# Patient Record
Sex: MALE | Race: WHITE | Employment: FULL TIME | URBAN - METROPOLITAN AREA
[De-identification: names, ages, dates, MRNs, and addresses within clinical notes are randomized per-mention and may not be internally consistent; named-entity substitution may affect disease eponyms.]

---

## 2018-03-31 ENCOUNTER — HOSPITAL ENCOUNTER (EMERGENCY)
Age: 48
Discharge: HOME | End: 2018-03-31
Attending: EMERGENCY MEDICINE
Payer: COMMERCIAL

## 2018-03-31 VITALS
OXYGEN SATURATION: 98 % | WEIGHT: 180 LBS | HEIGHT: 70 IN | TEMPERATURE: 97.9 F | DIASTOLIC BLOOD PRESSURE: 88 MMHG | SYSTOLIC BLOOD PRESSURE: 172 MMHG | BODY MASS INDEX: 25.77 KG/M2 | RESPIRATION RATE: 14 BRPM | HEART RATE: 94 BPM

## 2018-03-31 DIAGNOSIS — L03.115 CELLULITIS OF RIGHT FOOT: Primary | ICD-10-CM

## 2018-03-31 PROCEDURE — 6360000002 HC RX W HCPCS: Performed by: EMERGENCY MEDICINE

## 2018-03-31 PROCEDURE — 96372 THER/PROPH/DIAG INJ SC/IM: CPT

## 2018-03-31 PROCEDURE — 2580000003 HC RX 258

## 2018-03-31 PROCEDURE — 99282 EMERGENCY DEPT VISIT SF MDM: CPT

## 2018-03-31 RX ORDER — CEPHALEXIN 500 MG/1
500 CAPSULE ORAL 4 TIMES DAILY
Qty: 40 CAPSULE | Refills: 0 | Status: SHIPPED | OUTPATIENT
Start: 2018-03-31 | End: 2018-04-10

## 2018-03-31 RX ORDER — CEFAZOLIN SODIUM 1 G/3ML
1 INJECTION, POWDER, FOR SOLUTION INTRAMUSCULAR; INTRAVENOUS ONCE
Status: COMPLETED | OUTPATIENT
Start: 2018-03-31 | End: 2018-03-31

## 2018-03-31 RX ORDER — CLINDAMYCIN HYDROCHLORIDE 150 MG/1
150 CAPSULE ORAL 4 TIMES DAILY
Qty: 80 CAPSULE | Refills: 0 | Status: SHIPPED | OUTPATIENT
Start: 2018-03-31 | End: 2018-04-10

## 2018-03-31 RX ADMIN — CEFAZOLIN SODIUM 1 G: 1 INJECTION, POWDER, FOR SOLUTION INTRAMUSCULAR; INTRAVENOUS at 16:04

## 2018-03-31 RX ADMIN — WATER 2.5 ML: 1 INJECTION INTRAMUSCULAR; INTRAVENOUS; SUBCUTANEOUS at 16:04

## 2020-01-08 ENCOUNTER — TELEPHONE (OUTPATIENT)
Dept: WOUND CARE | Age: 50
End: 2020-01-08

## 2020-02-25 ENCOUNTER — HOSPITAL ENCOUNTER (OUTPATIENT)
Age: 50
Discharge: HOME | End: 2020-02-25
Payer: COMMERCIAL

## 2020-02-25 ENCOUNTER — HOSPITAL ENCOUNTER (OUTPATIENT)
Age: 50
Discharge: HOME | End: 2020-02-27
Payer: COMMERCIAL

## 2020-02-25 LAB
ANION GAP SERPL CALCULATED.3IONS-SCNC: 10 MMOL/L (ref 7–16)
BUN BLDV-MCNC: 22 MG/DL (ref 6–20)
CALCIUM SERPL-MCNC: 9.3 MG/DL (ref 8.6–10.2)
CHLORIDE BLD-SCNC: 101 MMOL/L (ref 98–107)
CO2: 28 MMOL/L (ref 22–29)
CREAT SERPL-MCNC: 1.1 MG/DL (ref 0.7–1.2)
GFR AFRICAN AMERICAN: >60
GFR NON-AFRICAN AMERICAN: >60 ML/MIN/1.73
GLUCOSE BLD-MCNC: 255 MG/DL (ref 74–99)
POTASSIUM SERPL-SCNC: 4.1 MMOL/L (ref 3.5–5)
SODIUM BLD-SCNC: 139 MMOL/L (ref 132–146)

## 2020-02-25 PROCEDURE — 80048 BASIC METABOLIC PNL TOTAL CA: CPT

## 2020-02-25 PROCEDURE — 93005 ELECTROCARDIOGRAM TRACING: CPT | Performed by: OPHTHALMOLOGY

## 2020-02-25 PROCEDURE — 36415 COLL VENOUS BLD VENIPUNCTURE: CPT

## 2020-02-26 LAB
EKG ATRIAL RATE: 82 BPM
EKG P AXIS: 53 DEGREES
EKG P-R INTERVAL: 140 MS
EKG Q-T INTERVAL: 378 MS
EKG QRS DURATION: 100 MS
EKG QTC CALCULATION (BAZETT): 441 MS
EKG R AXIS: 20 DEGREES
EKG T AXIS: 14 DEGREES
EKG VENTRICULAR RATE: 82 BPM

## 2020-02-26 PROCEDURE — 93010 ELECTROCARDIOGRAM REPORT: CPT | Performed by: INTERNAL MEDICINE

## 2020-04-01 RX ORDER — AMLODIPINE BESYLATE 5 MG/1
5 TABLET ORAL DAILY
COMMUNITY

## 2020-04-01 RX ORDER — PREDNISOLONE SODIUM PHOSPHATE 10 MG/ML
1 SOLUTION/ DROPS OPHTHALMIC DAILY
COMMUNITY

## 2020-04-01 RX ORDER — ROSUVASTATIN CALCIUM 5 MG/1
5 TABLET, COATED ORAL DAILY
COMMUNITY

## 2020-04-01 RX ORDER — GLIMEPIRIDE 4 MG/1
4 TABLET ORAL
COMMUNITY

## 2020-04-01 RX ORDER — GABAPENTIN 300 MG/1
300 CAPSULE ORAL EVERY EVENING
COMMUNITY

## 2020-04-01 NOTE — PROGRESS NOTES
[facility] PRE-ADMISSION TESTING INSTRUCTIONS    The Preadmission Testing patient is instructed accordingly using the following criteria (check applicable):    ARRIVAL INSTRUCTIONS:  [x] Parking the day of Surgery is located in the Main Entrance lot. Upon entering the door, make an immediate right to the surgery reception desk    [x] Bring photo ID and insurance card    [] Bring in a copy of Living will or Durable Power of  papers. [x] Please be sure to arrange for responsible adult to provide transportation to and from the hospital    [x] Please arrange for responsible adult to be with you for the 24 hour period post procedure due to having anesthesia      GENERAL INSTRUCTIONS:    [x] Nothing by mouth after midnight, including gum, candy, mints or water    [x] You may brush your teeth, but do not swallow any water    [x] Take medications as instructed with 1-2 oz of water    [x] Stop herbal supplements and vitamins 5 days prior to procedure    [x] Follow preop dosing of blood thinners per physician instructions    [] Take 1/2 dose of evening insulin, but no insulin after midnight    [x] No oral diabetic medications after midnight    [] If diabetic and have low blood sugar or feel symptomatic, take 1-2oz apple juice only    [] Bring inhalers day of surgery    [] Bring C-PAP/ Bi-Pap day of surgery    [] Bring urine specimen day of surgery    [x] Shower or bath with soap, lather and rinse well, AM of Surgery, no lotion, powders or creams to surgical site    [] Follow bowel prep as instructed per surgeon    [x] No tobacco products within 24 hours of surgery     [x] No alcohol or illegal drug use within 24 hours of surgery.     [x] Jewelry, body piercing's, eyeglasses, contact lenses and dentures are not permitted into surgery (bring cases)      [] Please do not wear any nail polish, make up or hair products on the day of surgery    [x] If not already done, you can expect a call from registration    [x] You can expect a call the business day prior to procedure to notify you if your arrival time changes    [] If you receive a survey after surgery we would greatly appreciate your comments    [] Parent/guardian of a minor must accompany their child and remain on the premises  the entire time they are under our care     [] Pediatric patients may bring favorite toy, blanket or comfort item with them    [] A caregiver or family member must remain with the patient during their stay if they are mentally handicapped, have dementia, disoriented or unable to use a call light or would be a safety concern if left unattended    [x] Please notify surgeon if you develop any illness between now and time of surgery (cold, cough, sore throat, fever, nausea, vomiting) or any signs of infections  including skin, wounds, and dental.    [x]  The Outpatient Pharmacy is available to fill your prescription here on your day of surgery, ask your preop nurse for details    [] Other instructions  EDUCATIONAL MATERIALS PROVIDED:    [] PAT Preoperative Education Packet/Booklet     [] Medication List    [] Fluoroscopy Information Pamphlet    [] Transfusion bracelet applied with instructions    [] Joint replacement video reviewed    [] Shower with soap, lather and rinse well, and use CHG wipes provided the evening before surgery as instructed

## 2020-04-06 NOTE — PROGRESS NOTES
Received call from [facility] from Dr Angelina Frausto, states verified with pt PCP preop medication instructions and will contact pt to reinstruct pt to take amlodipine am dos.

## 2020-04-07 ENCOUNTER — ANESTHESIA (OUTPATIENT)
Dept: OPERATING ROOM | Age: 50
End: 2020-04-07
Payer: COMMERCIAL

## 2020-04-07 ENCOUNTER — ANESTHESIA EVENT (OUTPATIENT)
Dept: OPERATING ROOM | Age: 50
End: 2020-04-07
Payer: COMMERCIAL

## 2020-04-07 ENCOUNTER — HOSPITAL ENCOUNTER (OUTPATIENT)
Age: 50
Setting detail: OUTPATIENT SURGERY
Discharge: HOME | End: 2020-04-07
Attending: OPHTHALMOLOGY | Admitting: OPHTHALMOLOGY
Payer: COMMERCIAL

## 2020-04-07 VITALS
HEIGHT: 70 IN | DIASTOLIC BLOOD PRESSURE: 76 MMHG | TEMPERATURE: 98.1 F | SYSTOLIC BLOOD PRESSURE: 140 MMHG | WEIGHT: 185 LBS | HEART RATE: 84 BPM | OXYGEN SATURATION: 98 % | BODY MASS INDEX: 26.48 KG/M2 | RESPIRATION RATE: 18 BRPM

## 2020-04-07 VITALS
DIASTOLIC BLOOD PRESSURE: 84 MMHG | OXYGEN SATURATION: 97 % | RESPIRATION RATE: 20 BRPM | SYSTOLIC BLOOD PRESSURE: 162 MMHG

## 2020-04-07 LAB
METER GLUCOSE: 108 MG/DL (ref 74–99)
METER GLUCOSE: 155 MG/DL (ref 74–99)

## 2020-04-07 PROCEDURE — 3700000000 HC ANESTHESIA ATTENDED CARE: Performed by: OPHTHALMOLOGY

## 2020-04-07 PROCEDURE — 7100000010 HC PHASE II RECOVERY - FIRST 15 MIN: Performed by: OPHTHALMOLOGY

## 2020-04-07 PROCEDURE — 2500000003 HC RX 250 WO HCPCS: Performed by: NURSE ANESTHETIST, CERTIFIED REGISTERED

## 2020-04-07 PROCEDURE — 3600000013 HC SURGERY LEVEL 3 ADDTL 15MIN: Performed by: OPHTHALMOLOGY

## 2020-04-07 PROCEDURE — 6370000000 HC RX 637 (ALT 250 FOR IP): Performed by: OPHTHALMOLOGY

## 2020-04-07 PROCEDURE — 6360000002 HC RX W HCPCS: Performed by: NURSE ANESTHETIST, CERTIFIED REGISTERED

## 2020-04-07 PROCEDURE — 2580000003 HC RX 258: Performed by: NURSE ANESTHETIST, CERTIFIED REGISTERED

## 2020-04-07 PROCEDURE — 2709999900 HC NON-CHARGEABLE SUPPLY: Performed by: OPHTHALMOLOGY

## 2020-04-07 PROCEDURE — C1784 OCULAR DEV, INTRAOP, DET RET: HCPCS | Performed by: OPHTHALMOLOGY

## 2020-04-07 PROCEDURE — 2720000010 HC SURG SUPPLY STERILE: Performed by: OPHTHALMOLOGY

## 2020-04-07 PROCEDURE — 2500000003 HC RX 250 WO HCPCS: Performed by: OPHTHALMOLOGY

## 2020-04-07 PROCEDURE — 3600000003 HC SURGERY LEVEL 3 BASE: Performed by: OPHTHALMOLOGY

## 2020-04-07 PROCEDURE — 3700000001 HC ADD 15 MINUTES (ANESTHESIA): Performed by: OPHTHALMOLOGY

## 2020-04-07 PROCEDURE — 6360000002 HC RX W HCPCS: Performed by: OPHTHALMOLOGY

## 2020-04-07 PROCEDURE — 82962 GLUCOSE BLOOD TEST: CPT

## 2020-04-07 PROCEDURE — 7100000011 HC PHASE II RECOVERY - ADDTL 15 MIN: Performed by: OPHTHALMOLOGY

## 2020-04-07 PROCEDURE — C1814 RETINAL TAMP, SILICONE OIL: HCPCS | Performed by: OPHTHALMOLOGY

## 2020-04-07 DEVICE — KIT OPHTH 7ML PERFLUOROCARBON LIQ PROC PERFLUORON: Type: IMPLANTABLE DEVICE | Site: EYE | Status: FUNCTIONAL

## 2020-04-07 DEVICE — SOLUTION SURG FLUID ADATOSIL 5000: Type: IMPLANTABLE DEVICE | Site: EYE | Status: FUNCTIONAL

## 2020-04-07 RX ORDER — PROPARACAINE HYDROCHLORIDE 5 MG/ML
1 SOLUTION/ DROPS OPHTHALMIC EVERY 5 MIN PRN
Status: COMPLETED | OUTPATIENT
Start: 2020-04-07 | End: 2020-04-07

## 2020-04-07 RX ORDER — FENTANYL CITRATE 50 UG/ML
INJECTION, SOLUTION INTRAMUSCULAR; INTRAVENOUS PRN
Status: DISCONTINUED | OUTPATIENT
Start: 2020-04-07 | End: 2020-04-07 | Stop reason: SDUPTHER

## 2020-04-07 RX ORDER — SODIUM CHLORIDE 0.9 % (FLUSH) 0.9 %
10 SYRINGE (ML) INJECTION PRN
Status: CANCELLED | OUTPATIENT
Start: 2020-04-07

## 2020-04-07 RX ORDER — SODIUM CHLORIDE, SODIUM LACTATE, POTASSIUM CHLORIDE, CALCIUM CHLORIDE 600; 310; 30; 20 MG/100ML; MG/100ML; MG/100ML; MG/100ML
INJECTION, SOLUTION INTRAVENOUS CONTINUOUS PRN
Status: DISCONTINUED | OUTPATIENT
Start: 2020-04-07 | End: 2020-04-07 | Stop reason: SDUPTHER

## 2020-04-07 RX ORDER — HYDROCODONE BITARTRATE AND ACETAMINOPHEN 5; 325 MG/1; MG/1
2 TABLET ORAL PRN
Status: DISCONTINUED | OUTPATIENT
Start: 2020-04-07 | End: 2020-04-07 | Stop reason: HOSPADM

## 2020-04-07 RX ORDER — LABETALOL HYDROCHLORIDE 5 MG/ML
INJECTION, SOLUTION INTRAVENOUS PRN
Status: DISCONTINUED | OUTPATIENT
Start: 2020-04-07 | End: 2020-04-07 | Stop reason: SDUPTHER

## 2020-04-07 RX ORDER — SODIUM CHLORIDE 0.9 % (FLUSH) 0.9 %
10 SYRINGE (ML) INJECTION EVERY 12 HOURS SCHEDULED
Status: DISCONTINUED | OUTPATIENT
Start: 2020-04-07 | End: 2020-04-07 | Stop reason: HOSPADM

## 2020-04-07 RX ORDER — HYDROCODONE BITARTRATE AND ACETAMINOPHEN 5; 325 MG/1; MG/1
1 TABLET ORAL PRN
Status: DISCONTINUED | OUTPATIENT
Start: 2020-04-07 | End: 2020-04-07 | Stop reason: HOSPADM

## 2020-04-07 RX ORDER — CYCLOPENTOLATE HYDROCHLORIDE 10 MG/ML
1 SOLUTION/ DROPS OPHTHALMIC EVERY 5 MIN PRN
Status: COMPLETED | OUTPATIENT
Start: 2020-04-07 | End: 2020-04-07

## 2020-04-07 RX ORDER — SODIUM CHLORIDE 0.9 % (FLUSH) 0.9 %
10 SYRINGE (ML) INJECTION EVERY 12 HOURS SCHEDULED
Status: CANCELLED | OUTPATIENT
Start: 2020-04-07

## 2020-04-07 RX ORDER — PROPARACAINE HYDROCHLORIDE 5 MG/ML
SOLUTION/ DROPS OPHTHALMIC PRN
Status: DISCONTINUED | OUTPATIENT
Start: 2020-04-07 | End: 2020-04-07 | Stop reason: ALTCHOICE

## 2020-04-07 RX ORDER — MIDAZOLAM HYDROCHLORIDE 1 MG/ML
INJECTION INTRAMUSCULAR; INTRAVENOUS PRN
Status: DISCONTINUED | OUTPATIENT
Start: 2020-04-07 | End: 2020-04-07 | Stop reason: SDUPTHER

## 2020-04-07 RX ORDER — SODIUM CHLORIDE 0.9 % (FLUSH) 0.9 %
10 SYRINGE (ML) INJECTION PRN
Status: DISCONTINUED | OUTPATIENT
Start: 2020-04-07 | End: 2020-04-07 | Stop reason: HOSPADM

## 2020-04-07 RX ORDER — DEXAMETHASONE SODIUM PHOSPHATE 4 MG/ML
INJECTION, SOLUTION INTRA-ARTICULAR; INTRALESIONAL; INTRAMUSCULAR; INTRAVENOUS; SOFT TISSUE PRN
Status: DISCONTINUED | OUTPATIENT
Start: 2020-04-07 | End: 2020-04-07 | Stop reason: SDUPTHER

## 2020-04-07 RX ORDER — SODIUM CHLORIDE 9 MG/ML
INJECTION, SOLUTION INTRAVENOUS CONTINUOUS PRN
Status: DISCONTINUED | OUTPATIENT
Start: 2020-04-07 | End: 2020-04-07

## 2020-04-07 RX ORDER — CEFAZOLIN SODIUM 1 G/3ML
INJECTION, POWDER, FOR SOLUTION INTRAMUSCULAR; INTRAVENOUS PRN
Status: DISCONTINUED | OUTPATIENT
Start: 2020-04-07 | End: 2020-04-07 | Stop reason: ALTCHOICE

## 2020-04-07 RX ORDER — PHENYLEPHRINE HYDROCHLORIDE 100 MG/ML
1 SOLUTION/ DROPS OPHTHALMIC EVERY 5 MIN PRN
Status: COMPLETED | OUTPATIENT
Start: 2020-04-07 | End: 2020-04-07

## 2020-04-07 RX ORDER — DEXAMETHASONE SODIUM PHOSPHATE 10 MG/ML
INJECTION INTRAMUSCULAR; INTRAVENOUS PRN
Status: DISCONTINUED | OUTPATIENT
Start: 2020-04-07 | End: 2020-04-07 | Stop reason: ALTCHOICE

## 2020-04-07 RX ORDER — TROPICAMIDE 10 MG/ML
1 SOLUTION/ DROPS OPHTHALMIC EVERY 5 MIN PRN
Status: COMPLETED | OUTPATIENT
Start: 2020-04-07 | End: 2020-04-07

## 2020-04-07 RX ORDER — ONDANSETRON 2 MG/ML
INJECTION INTRAMUSCULAR; INTRAVENOUS PRN
Status: DISCONTINUED | OUTPATIENT
Start: 2020-04-07 | End: 2020-04-07 | Stop reason: SDUPTHER

## 2020-04-07 RX ADMIN — SODIUM CHLORIDE, POTASSIUM CHLORIDE, SODIUM LACTATE AND CALCIUM CHLORIDE: 600; 310; 30; 20 INJECTION, SOLUTION INTRAVENOUS at 13:04

## 2020-04-07 RX ADMIN — LABETALOL HYDROCHLORIDE 10 MG: 5 INJECTION INTRAVENOUS at 14:26

## 2020-04-07 RX ADMIN — Medication 1 DROP: at 11:04

## 2020-04-07 RX ADMIN — MIDAZOLAM 2 MG: 1 INJECTION INTRAMUSCULAR; INTRAVENOUS at 12:11

## 2020-04-07 RX ADMIN — ONDANSETRON HYDROCHLORIDE 4 MG: 2 INJECTION, SOLUTION INTRAMUSCULAR; INTRAVENOUS at 12:17

## 2020-04-07 RX ADMIN — PHENYLEPHRINE HYDROCHLORIDE 1 DROP: 100 SOLUTION/ DROPS OPHTHALMIC at 11:12

## 2020-04-07 RX ADMIN — Medication 1 DROP: at 11:12

## 2020-04-07 RX ADMIN — SODIUM CHLORIDE, POTASSIUM CHLORIDE, SODIUM LACTATE AND CALCIUM CHLORIDE: 600; 310; 30; 20 INJECTION, SOLUTION INTRAVENOUS at 12:11

## 2020-04-07 RX ADMIN — PHENYLEPHRINE HYDROCHLORIDE 1 DROP: 100 SOLUTION/ DROPS OPHTHALMIC at 11:04

## 2020-04-07 RX ADMIN — Medication 1 DROP: at 11:16

## 2020-04-07 RX ADMIN — DEXAMETHASONE SODIUM PHOSPHATE 10 MG: 4 INJECTION, SOLUTION INTRAMUSCULAR; INTRAVENOUS at 12:17

## 2020-04-07 RX ADMIN — MIDAZOLAM 1 MG: 1 INJECTION INTRAMUSCULAR; INTRAVENOUS at 13:34

## 2020-04-07 RX ADMIN — Medication 1 DROP: at 11:17

## 2020-04-07 RX ADMIN — MIDAZOLAM 1 MG: 1 INJECTION INTRAMUSCULAR; INTRAVENOUS at 12:55

## 2020-04-07 RX ADMIN — PHENYLEPHRINE HYDROCHLORIDE 1 DROP: 100 SOLUTION/ DROPS OPHTHALMIC at 11:17

## 2020-04-07 RX ADMIN — FENTANYL CITRATE 100 MCG: 50 INJECTION, SOLUTION INTRAMUSCULAR; INTRAVENOUS at 12:13

## 2020-04-07 ASSESSMENT — PULMONARY FUNCTION TESTS
PIF_VALUE: 0
PIF_VALUE: 1
PIF_VALUE: 1

## 2020-04-07 ASSESSMENT — PAIN SCALES - GENERAL
PAINLEVEL_OUTOF10: 0
PAINLEVEL_OUTOF10: 0

## 2020-04-08 NOTE — OP NOTE
wire  lid speculum was inserted into the conjunctival fornices. Trocars were  introduced inferotemporally, superotemporally, and superonasally each  3.5 mm posterior to the limbus. An infusion cannula was opened under  direct visualization. A core vitrectomy was carried out. After the  blood had been evacuated, it was noted that there was still attached  hyaloid noted in the superonasal quadrant as well as in the superior  area and as well as in the inferotemporal area where there was a contraction   of the hyaloid. The vitreous was then  peeled and trimmed as best as possible. At the beginning of the case,  there was noted to be several retinal holes, and the retina scattered  throughout the superonasal as well as the temporal area; and with  membrane peeling, these holes enlarged or several new holes did form. Therefore, after his muscle tractions could be relived, it was necessary  to perform an inferior retinectomy as there was multiple retinal holes,  most likely nonfunctional retina in that area; therefore, an inferior  retinectomy was performed, and the retina was then flattened with  perfluorocarbon. Air-fluid exchange was then performed, and the retina  flattened nicely. Endolaser was then done, and any residual  perfluorocarbon was then removed, and silicon oil was then placed at a  pressure of 15. After adequate silicon oil was filled, trocars were  removed and closed with interrupted 7-0 Vicryl stitches. At the end of  the case, there was good physiologic pressure of 15. Subconjunctival  dexamethasone and Ancef were injected. TobraDex ointment was placed in  the eye, and the patient returned to the recovery room in stable  condition after the eye was patched and shielded.         Judit Prather MD    D: 04/07/2020 15:03:43       T: 04/07/2020 16:10:46     [id number]  Job#: [id number]     Doc#: [id number]    CC:

## 2023-07-07 ENCOUNTER — HOSPITAL ENCOUNTER (EMERGENCY)
Age: 53
Discharge: HOME | End: 2023-07-08
Attending: EMERGENCY MEDICINE
Payer: COMMERCIAL

## 2023-07-07 ENCOUNTER — APPOINTMENT (OUTPATIENT)
Dept: GENERAL RADIOLOGY | Age: 53
End: 2023-07-07
Payer: COMMERCIAL

## 2023-07-07 VITALS
RESPIRATION RATE: 16 BRPM | HEART RATE: 94 BPM | SYSTOLIC BLOOD PRESSURE: 170 MMHG | TEMPERATURE: 97.6 F | DIASTOLIC BLOOD PRESSURE: 88 MMHG | OXYGEN SATURATION: 98 %

## 2023-07-07 DIAGNOSIS — S90.822A BLISTER OF LEFT FOOT, INITIAL ENCOUNTER: Primary | ICD-10-CM

## 2023-07-07 DIAGNOSIS — I10 HYPERTENSION, UNSPECIFIED TYPE: ICD-10-CM

## 2023-07-07 DIAGNOSIS — N17.9 AKI (ACUTE KIDNEY INJURY) (HCC): ICD-10-CM

## 2023-07-07 LAB
BASOPHILS # BLD: 0.04 E9/L (ref 0–0.2)
BASOPHILS NFR BLD: 0.5 % (ref 0–2)
CHP ED QC CHECK: NORMAL
EOSINOPHIL # BLD: 0.16 E9/L (ref 0.05–0.5)
EOSINOPHIL NFR BLD: 2.1 % (ref 0–6)
ERYTHROCYTE [DISTWIDTH] IN BLOOD BY AUTOMATED COUNT: 13.4 FL (ref 11.5–15)
GLUCOSE BLD-MCNC: 111 MG/DL
HCT VFR BLD AUTO: 35.8 % (ref 37–54)
HGB BLD-MCNC: 12.7 G/DL (ref 12.5–16.5)
IMM GRANULOCYTES # BLD: 0.02 E9/L
IMM GRANULOCYTES NFR BLD: 0.3 % (ref 0–5)
LYMPHOCYTES # BLD: 1.13 E9/L (ref 1.5–4)
LYMPHOCYTES NFR BLD: 15 % (ref 20–42)
MCH RBC QN AUTO: 28.7 PG (ref 26–35)
MCHC RBC AUTO-ENTMCNC: 35.5 % (ref 32–34.5)
MCV RBC AUTO: 81 FL (ref 80–99.9)
METER GLUCOSE: 111 MG/DL (ref 74–99)
MONOCYTES # BLD: 0.92 E9/L (ref 0.1–0.95)
MONOCYTES NFR BLD: 12.2 % (ref 2–12)
NEUTROPHILS # BLD: 5.25 E9/L (ref 1.8–7.3)
NEUTS SEG NFR BLD: 69.9 % (ref 43–80)
PLATELET # BLD AUTO: 177 E9/L (ref 130–450)
PMV BLD AUTO: 10.4 FL (ref 7–12)
RBC # BLD AUTO: 4.42 E12/L (ref 3.8–5.8)
WBC # BLD: 7.5 E9/L (ref 4.5–11.5)

## 2023-07-07 PROCEDURE — 99284 EMERGENCY DEPT VISIT MOD MDM: CPT

## 2023-07-07 PROCEDURE — 82962 GLUCOSE BLOOD TEST: CPT

## 2023-07-07 PROCEDURE — 80048 BASIC METABOLIC PNL TOTAL CA: CPT

## 2023-07-07 PROCEDURE — 73630 X-RAY EXAM OF FOOT: CPT

## 2023-07-07 PROCEDURE — 85025 COMPLETE CBC W/AUTO DIFF WBC: CPT

## 2023-07-07 PROCEDURE — 36415 COLL VENOUS BLD VENIPUNCTURE: CPT

## 2023-07-07 PROCEDURE — 6370000000 HC RX 637 (ALT 250 FOR IP): Performed by: EMERGENCY MEDICINE

## 2023-07-07 RX ORDER — BACITRACIN ZINC 500 [USP'U]/G
OINTMENT TOPICAL ONCE
Status: COMPLETED | OUTPATIENT
Start: 2023-07-07 | End: 2023-07-07

## 2023-07-07 RX ORDER — BACITRACIN ZINC AND POLYMYXIN B SULFATE 500; 1000 [USP'U]/G; [USP'U]/G
OINTMENT TOPICAL
Qty: 30 G | Refills: 0 | Status: SHIPPED | OUTPATIENT
Start: 2023-07-07 | End: 2023-07-14

## 2023-07-07 RX ORDER — CEPHALEXIN 500 MG/1
500 CAPSULE ORAL 4 TIMES DAILY
Qty: 28 CAPSULE | Refills: 0 | Status: SHIPPED | OUTPATIENT
Start: 2023-07-07 | End: 2023-07-14

## 2023-07-07 RX ADMIN — BACITRACIN ZINC: 500 OINTMENT TOPICAL at 23:30

## 2023-07-07 ASSESSMENT — LIFESTYLE VARIABLES
HOW MANY STANDARD DRINKS CONTAINING ALCOHOL DO YOU HAVE ON A TYPICAL DAY: PATIENT DOES NOT DRINK
HOW OFTEN DO YOU HAVE A DRINK CONTAINING ALCOHOL: NEVER

## 2023-07-07 ASSESSMENT — PAIN - FUNCTIONAL ASSESSMENT: PAIN_FUNCTIONAL_ASSESSMENT: NONE - DENIES PAIN

## 2023-07-08 LAB
ANION GAP SERPL CALCULATED.3IONS-SCNC: 12 MMOL/L (ref 7–16)
BUN SERPL-MCNC: 40 MG/DL (ref 6–20)
CALCIUM SERPL-MCNC: 9.1 MG/DL (ref 8.6–10.2)
CHLORIDE SERPL-SCNC: 103 MMOL/L (ref 98–107)
CO2 SERPL-SCNC: 22 MMOL/L (ref 22–29)
CREAT SERPL-MCNC: 1.9 MG/DL (ref 0.7–1.2)
GLUCOSE SERPL-MCNC: 113 MG/DL (ref 74–99)
POTASSIUM SERPL-SCNC: 4.2 MMOL/L (ref 3.5–5)
SODIUM SERPL-SCNC: 137 MMOL/L (ref 132–146)

## 2023-07-08 RX ORDER — 0.9 % SODIUM CHLORIDE 0.9 %
1000 INTRAVENOUS SOLUTION INTRAVENOUS ONCE
Status: DISCONTINUED | OUTPATIENT
Start: 2023-07-08 | End: 2023-07-08 | Stop reason: HOSPADM

## 2023-07-08 ASSESSMENT — ENCOUNTER SYMPTOMS
BACK PAIN: 0
COUGH: 0
COLOR CHANGE: 1
NAUSEA: 0
ABDOMINAL PAIN: 0
SHORTNESS OF BREATH: 0
TROUBLE SWALLOWING: 0
DIARRHEA: 0

## 2023-07-08 ASSESSMENT — PAIN - FUNCTIONAL ASSESSMENT: PAIN_FUNCTIONAL_ASSESSMENT: NONE - DENIES PAIN

## 2023-07-08 NOTE — DISCHARGE INSTRUCTIONS
Call family doctor tomorrow and schedule a followup appointment to be seen in 2 days    Have your doctor obtain  finalized report of all testing    XR FOOT LEFT (MIN 3 VIEWS)   Final Result   1. Soft tissue irregularity and soft tissue swelling along the palmar aspect   of the left forefoot seen best on the lateral view. 2.  No evidence of osseous destruction or erosion. Interval left large toe   partial amputation. Vascular calcifications in the soft tissues.            Results for orders placed or performed during the hospital encounter of 07/07/23   CBC with Auto Differential   Result Value Ref Range    WBC 7.5 4.5 - 11.5 E9/L    RBC 4.42 3.80 - 5.80 E12/L    Hemoglobin 12.7 12.5 - 16.5 g/dL    Hematocrit 35.8 (L) 37.0 - 54.0 %    MCV 81.0 80.0 - 99.9 fL    MCH 28.7 26.0 - 35.0 pg    MCHC 35.5 (H) 32.0 - 34.5 %    RDW 13.4 11.5 - 15.0 fL    Platelets 061 350 - 705 E9/L    MPV 10.4 7.0 - 12.0 fL    Neutrophils % 69.9 43.0 - 80.0 %    Immature Granulocytes % 0.3 0.0 - 5.0 %    Lymphocytes % 15.0 (L) 20.0 - 42.0 %    Monocytes % 12.2 (H) 2.0 - 12.0 %    Eosinophils % 2.1 0.0 - 6.0 %    Basophils % 0.5 0.0 - 2.0 %    Neutrophils Absolute 5.25 1.80 - 7.30 E9/L    Immature Granulocytes # 0.02 E9/L    Lymphocytes Absolute 1.13 (L) 1.50 - 4.00 E9/L    Monocytes Absolute 0.92 0.10 - 0.95 E9/L    Eosinophils Absolute 0.16 0.05 - 0.50 E9/L    Basophils Absolute 0.04 0.00 - 0.20 E9/L   BMP   Result Value Ref Range    Sodium 137 132 - 146 mmol/L    Potassium 4.2 3.5 - 5.0 mmol/L    Chloride 103 98 - 107 mmol/L    CO2 22 22 - 29 mmol/L    Anion Gap 12 7 - 16 mmol/L    Glucose 113 (H) 74 - 99 mg/dL    BUN 40 (H) 6 - 20 mg/dL    Creatinine 1.9 (H) 0.7 - 1.2 mg/dL    Est, Glom Filt Rate 42 >=60 mL/min/1.73    Calcium 9.1 8.6 - 10.2 mg/dL   POCT Glucose   Result Value Ref Range    Glucose 111 mg/dL    QC OK? pass    POCT Glucose   Result Value Ref Range    Meter Glucose 111 (H) 74 - 99 mg/dL

## 2023-08-02 LAB — URINE CULTURE: NORMAL

## 2023-08-17 ENCOUNTER — HOSPITAL ENCOUNTER (OUTPATIENT)
Age: 53
Discharge: HOME | End: 2023-08-17
Payer: COMMERCIAL

## 2023-08-17 LAB
CHOLEST SERPL-MCNC: 126 MG/DL
CREAT SERPL-MCNC: 1.7 MG/DL (ref 0.7–1.2)
FSH SERPL-ACNC: 2.6 MIU/ML
GFR SERPL CREATININE-BSD FRML MDRD: 48 ML/MIN/1.73M2
HBA1C MFR BLD: 6.3 % (ref 4–5.6)
HCT VFR BLD AUTO: 40 % (ref 37–54)
HDLC SERPL-MCNC: 41 MG/DL
LDLC SERPL CALC-MCNC: 67 MG/DL
LH SERPL-ACNC: 4.9 MIU/ML
PROLACTIN SERPL-MCNC: 6.06 NG/ML
TESTOST SERPL-MCNC: 514 NG/DL (ref 193–740)
TRIGL SERPL-MCNC: 88 MG/DL
VLDLC SERPL CALC-MCNC: 18 MG/DL

## 2023-08-17 PROCEDURE — 84146 ASSAY OF PROLACTIN: CPT

## 2023-08-17 PROCEDURE — 83002 ASSAY OF GONADOTROPIN (LH): CPT

## 2023-08-17 PROCEDURE — 80061 LIPID PANEL: CPT

## 2023-08-17 PROCEDURE — 83036 HEMOGLOBIN GLYCOSYLATED A1C: CPT

## 2023-08-17 PROCEDURE — 82565 ASSAY OF CREATININE: CPT

## 2023-08-17 PROCEDURE — 83001 ASSAY OF GONADOTROPIN (FSH): CPT

## 2023-08-17 PROCEDURE — 84403 ASSAY OF TOTAL TESTOSTERONE: CPT

## 2023-08-17 PROCEDURE — 85014 HEMATOCRIT: CPT

## 2023-10-11 ENCOUNTER — DOCUMENTATION (OUTPATIENT)
Dept: PREADMISSION TESTING | Facility: HOSPITAL | Age: 53
End: 2023-10-11
Payer: COMMERCIAL

## 2023-10-11 PROBLEM — E29.1 HYPOGONADISM IN MALE: Status: ACTIVE | Noted: 2023-10-11

## 2023-10-11 PROBLEM — N52.9 ERECTILE DYSFUNCTION: Status: ACTIVE | Noted: 2023-10-11

## 2023-10-11 RX ORDER — AMLODIPINE BESYLATE 5 MG/1
5 TABLET ORAL DAILY
COMMUNITY
Start: 2023-06-03 | End: 2023-10-11 | Stop reason: WASHOUT

## 2023-10-11 RX ORDER — DORZOLAMIDE HCL 20 MG/ML
SOLUTION/ DROPS OPHTHALMIC
COMMUNITY
Start: 2023-07-28 | End: 2023-10-11 | Stop reason: WASHOUT

## 2023-10-11 RX ORDER — ROSUVASTATIN CALCIUM 5 MG/1
5 TABLET, COATED ORAL DAILY
COMMUNITY
Start: 2023-05-29

## 2023-10-11 RX ORDER — PIOGLITAZONEHYDROCHLORIDE 45 MG/1
45 TABLET ORAL DAILY
COMMUNITY
Start: 2022-11-14 | End: 2023-10-11 | Stop reason: WASHOUT

## 2023-10-11 RX ORDER — BRIMONIDINE TARTRATE 2 MG/ML
SOLUTION/ DROPS OPHTHALMIC
COMMUNITY
Start: 2023-08-04

## 2023-10-11 RX ORDER — SITAGLIPTIN 100 MG/1
100 TABLET, FILM COATED ORAL DAILY
COMMUNITY
Start: 2023-02-23 | End: 2023-10-11 | Stop reason: WASHOUT

## 2023-10-11 RX ORDER — TIMOLOL MALEATE 5 MG/ML
SOLUTION/ DROPS OPHTHALMIC
COMMUNITY
Start: 2023-08-04 | End: 2023-10-11 | Stop reason: WASHOUT

## 2023-10-11 RX ORDER — LOSARTAN POTASSIUM 50 MG/1
50 TABLET ORAL DAILY
COMMUNITY
Start: 2023-06-24

## 2023-10-11 RX ORDER — TADALAFIL 20 MG/1
TABLET ORAL
COMMUNITY
Start: 2022-10-14 | End: 2023-10-11 | Stop reason: WASHOUT

## 2023-10-11 RX ORDER — ESCITALOPRAM OXALATE 10 MG/1
10 TABLET ORAL DAILY
COMMUNITY
Start: 2023-03-23 | End: 2023-10-11 | Stop reason: WASHOUT

## 2023-10-11 RX ORDER — ZOLPIDEM TARTRATE 10 MG/1
1 TABLET ORAL NIGHTLY PRN
COMMUNITY
Start: 2023-08-10

## 2023-10-11 RX ORDER — BRIMONIDINE TARTRATE, TIMOLOL MALEATE 2; 5 MG/ML; MG/ML
1 SOLUTION/ DROPS OPHTHALMIC 2 TIMES DAILY
COMMUNITY
Start: 2023-01-05

## 2023-10-11 RX ORDER — BACITRACIN ZINC/POLYMYXIN B 500-10K/G
OINTMENT (GRAM) TOPICAL
COMMUNITY
Start: 2023-07-07 | End: 2023-10-11 | Stop reason: WASHOUT

## 2023-10-11 RX ORDER — EMPAGLIFLOZIN 25 MG/1
TABLET, FILM COATED ORAL
COMMUNITY
Start: 2023-07-13 | End: 2023-10-11 | Stop reason: WASHOUT

## 2023-10-11 RX ORDER — GABAPENTIN 300 MG/1
300 CAPSULE ORAL 3 TIMES DAILY
COMMUNITY
Start: 2023-01-23 | End: 2023-10-11 | Stop reason: WASHOUT

## 2023-10-19 ENCOUNTER — PRE-ADMISSION TESTING (OUTPATIENT)
Dept: PREADMISSION TESTING | Facility: HOSPITAL | Age: 53
End: 2023-10-19
Payer: MEDICARE

## 2023-10-19 ENCOUNTER — LAB (OUTPATIENT)
Dept: LAB | Facility: LAB | Age: 53
End: 2023-10-19
Payer: MEDICARE

## 2023-10-19 VITALS
TEMPERATURE: 93.6 F | WEIGHT: 203.71 LBS | OXYGEN SATURATION: 99 % | RESPIRATION RATE: 18 BRPM | HEART RATE: 78 BPM | SYSTOLIC BLOOD PRESSURE: 148 MMHG | HEIGHT: 70 IN | BODY MASS INDEX: 29.16 KG/M2 | DIASTOLIC BLOOD PRESSURE: 80 MMHG

## 2023-10-19 DIAGNOSIS — E11.39 TYPE 2 DIABETES MELLITUS WITH OTHER OPHTHALMIC COMPLICATION, WITHOUT LONG-TERM CURRENT USE OF INSULIN (MULTI): ICD-10-CM

## 2023-10-19 DIAGNOSIS — R39.9 GENITOURINARY SYMPTOMS: ICD-10-CM

## 2023-10-19 DIAGNOSIS — I10 HTN (HYPERTENSION), BENIGN: ICD-10-CM

## 2023-10-19 DIAGNOSIS — I10 HTN (HYPERTENSION), BENIGN: Primary | ICD-10-CM

## 2023-10-19 DIAGNOSIS — Z01.818 PREOP TESTING: ICD-10-CM

## 2023-10-19 LAB
ABO GROUP (TYPE) IN BLOOD: NORMAL
ANION GAP SERPL CALC-SCNC: 12 MMOL/L (ref 10–20)
ANTIBODY SCREEN: NORMAL
BASOPHILS # BLD AUTO: 0.05 X10*3/UL (ref 0–0.1)
BASOPHILS NFR BLD AUTO: 0.9 %
BUN SERPL-MCNC: 30 MG/DL (ref 6–23)
CALCIUM SERPL-MCNC: 9.3 MG/DL (ref 8.6–10.3)
CHLORIDE SERPL-SCNC: 100 MMOL/L (ref 98–107)
CO2 SERPL-SCNC: 28 MMOL/L (ref 21–32)
CREAT SERPL-MCNC: 1.34 MG/DL (ref 0.5–1.3)
EOSINOPHIL # BLD AUTO: 0.46 X10*3/UL (ref 0–0.7)
EOSINOPHIL NFR BLD AUTO: 7.9 %
ERYTHROCYTE [DISTWIDTH] IN BLOOD BY AUTOMATED COUNT: 13.1 % (ref 11.5–14.5)
EST. AVERAGE GLUCOSE BLD GHB EST-MCNC: 157 MG/DL
GFR SERPL CREATININE-BSD FRML MDRD: 63 ML/MIN/1.73M*2
GLUCOSE SERPL-MCNC: 238 MG/DL (ref 74–99)
HBA1C MFR BLD: 7.1 %
HCT VFR BLD AUTO: 39.4 % (ref 41–52)
HGB BLD-MCNC: 13.5 G/DL (ref 13.5–17.5)
IMM GRANULOCYTES # BLD AUTO: 0.01 X10*3/UL (ref 0–0.7)
IMM GRANULOCYTES NFR BLD AUTO: 0.2 % (ref 0–0.9)
LYMPHOCYTES # BLD AUTO: 1.36 X10*3/UL (ref 1.2–4.8)
LYMPHOCYTES NFR BLD AUTO: 23.3 %
MCH RBC QN AUTO: 28 PG (ref 26–34)
MCHC RBC AUTO-ENTMCNC: 34.3 G/DL (ref 32–36)
MCV RBC AUTO: 82 FL (ref 80–100)
MONOCYTES # BLD AUTO: 0.6 X10*3/UL (ref 0.1–1)
MONOCYTES NFR BLD AUTO: 10.3 %
NEUTROPHILS # BLD AUTO: 3.36 X10*3/UL (ref 1.2–7.7)
NEUTROPHILS NFR BLD AUTO: 57.4 %
NRBC BLD-RTO: 0 /100 WBCS (ref 0–0)
PLATELET # BLD AUTO: 187 X10*3/UL (ref 150–450)
PMV BLD AUTO: 11 FL (ref 7.5–11.5)
POTASSIUM SERPL-SCNC: 4 MMOL/L (ref 3.5–5.3)
RBC # BLD AUTO: 4.82 X10*6/UL (ref 4.5–5.9)
RH FACTOR (ANTIGEN D): NORMAL
SODIUM SERPL-SCNC: 136 MMOL/L (ref 136–145)
WBC # BLD AUTO: 5.8 X10*3/UL (ref 4.4–11.3)

## 2023-10-19 PROCEDURE — 80048 BASIC METABOLIC PNL TOTAL CA: CPT

## 2023-10-19 PROCEDURE — 86850 RBC ANTIBODY SCREEN: CPT

## 2023-10-19 PROCEDURE — 86900 BLOOD TYPING SEROLOGIC ABO: CPT

## 2023-10-19 PROCEDURE — 87086 URINE CULTURE/COLONY COUNT: CPT

## 2023-10-19 PROCEDURE — 85025 COMPLETE CBC W/AUTO DIFF WBC: CPT

## 2023-10-19 PROCEDURE — 86901 BLOOD TYPING SEROLOGIC RH(D): CPT

## 2023-10-19 PROCEDURE — 93005 ELECTROCARDIOGRAM TRACING: CPT | Performed by: PHYSICIAN ASSISTANT

## 2023-10-19 PROCEDURE — 83036 HEMOGLOBIN GLYCOSYLATED A1C: CPT

## 2023-10-19 PROCEDURE — 87081 CULTURE SCREEN ONLY: CPT | Mod: AHULAB,CMCLAB | Performed by: UROLOGY

## 2023-10-19 PROCEDURE — 99203 OFFICE O/P NEW LOW 30 MIN: CPT | Performed by: PHYSICIAN ASSISTANT

## 2023-10-19 PROCEDURE — 36415 COLL VENOUS BLD VENIPUNCTURE: CPT

## 2023-10-19 RX ORDER — CHLORHEXIDINE GLUCONATE ORAL RINSE 1.2 MG/ML
SOLUTION DENTAL
Qty: 475 ML | Refills: 0 | Status: SHIPPED | OUTPATIENT
Start: 2023-10-19

## 2023-10-19 RX ORDER — BISMUTH SUBSALICYLATE 262 MG
1 TABLET,CHEWABLE ORAL DAILY
COMMUNITY

## 2023-10-19 ASSESSMENT — ENCOUNTER SYMPTOMS
CHILLS: 0
LIMITED RANGE OF MOTION: 0
VOMITING: 0
SHORTNESS OF BREATH: 0
DIARRHEA: 0
WHEEZING: 0
CONFUSION: 0
NECK PAIN: 0
BRUISES/BLEEDS EASILY: 0
HEMOPTYSIS: 0
SKIN CHANGES: 0
DOUBLE VISION: 0
EYE DISCHARGE: 0
WOUND: 0
FEVER: 0
ARTHRALGIAS: 0
WEAKNESS: 0
LIGHT-HEADEDNESS: 0
NECK STIFFNESS: 0
NAUSEA: 0
ABDOMINAL DISTENTION: 0
EXCESSIVE BLEEDING: 0
BLOOD IN STOOL: 0
SINUS CONGESTION: 0
ABDOMINAL PAIN: 0
COUGH: 0
NUMBNESS: 0
DYSURIA: 0
MYALGIAS: 0
PALPITATIONS: 0
RHINORRHEA: 0
VISUAL CHANGE: 1
EYE PAIN: 0
DIFFICULTY URINATING: 0
TROUBLE SWALLOWING: 0
DYSPNEA WITH EXERTION: 0
UNEXPECTED WEIGHT CHANGE: 0
DYSPNEA AT REST: 0
CONSTIPATION: 0

## 2023-10-19 NOTE — H&P (VIEW-ONLY)
[facility] Evaluation       Name: Neno Chew (Neno Chew)  /Age: 1970/53 y.o.         Date of Consult: 10/19/23    Referring Provider: Dr. Patel    Surgery, Date, and Length: Insertion Inflatable Penile Prosthesis , 10/26/23, 150MIN    Neno Chew is a 53 year-old female who presents to the [facility] for perioperative risk assessment prior to surgery.    Patient presents with a primary diagnosis of erectile dysfunction  x 25 years.  He has tried PDE-5i which provided no benefit.  He has not tried intracavernosal injections to date, due to blindness.  Of note, he is s/p multiple eye surgeries and is now legally blind as a result of complications of diabetes.      This note was created in part upon personal review of patient's medical records.      Patient is scheduled to have Insertion Inflatable Penile Prosthesis       Pt denies any past history of anesthetic complications such as PONV, awareness, prolonged sedation, dental damage, aspiration, cardiac arrest, difficult intubation, difficult I.V. access or unexpected hospital admissions.  NO malignant hyperthermia and or pseudocholinesterase deficiency.  No history of blood transfusions     The patient is not a Sikh and will accept blood and blood products if medically indicated.   Type and screen sent.     Past Medical History:   Diagnosis Date    Adjustment disorder     Blindness     Carpal tunnel syndrome, bilateral     CKD (chronic kidney disease)     stage 3, 23: creatinine 1.7, GFR 48    Diabetes mellitus (CMS/[facility])     23 HgbA1C 6.3%    Diabetic neuropathy (CMS/[facility])     Hyperlipidemia     Hypertension     Insomnia        Past Surgical History:   Procedure Laterality Date    APPENDECTOMY      CATARACT EXTRACTION Bilateral     COLONOSCOPY      TOE AMPUTATION Left     left great toe, skin grafting of 3 large wounds over dorsum of left foot    VASECTOMY         Patient  has no history on file for sexual  activity.    No family history on file.    Social History     Socioeconomic History    Marital status:      Spouse name: Not on file    Number of children: Not on file    Years of education: Not on file    Highest education level: Not on file   Occupational History    Not on file   Tobacco Use    Smoking status: Never    Smokeless tobacco: Never   Substance and Sexual Activity    Alcohol use: Never    Drug use: Never    Sexual activity: Not on file   Other Topics Concern    Not on file   Social History Narrative    Not on file     Social Determinants of Health     Financial Resource Strain: Not on file   Food Insecurity: Not on file   Transportation Needs: Not on file   Physical Activity: Not on file   Stress: Not on file   Social Connections: Not on file   Intimate Partner Violence: Not on file   Housing Stability: Not on file        No Known Allergies    Prior to Admission medications    Medication Sig Start Date End Date Taking? Authorizing Provider   brimonidine (AlphaGAN P) 0.2 % ophthalmic solution INSTILL 1 DROP INTO INTO LEFT EYE TWICE A DAY 8/4/23   Historical Provider, MD   chlorhexidine (Peridex) 0.12 % solution Swish for 30 seconds and spit 15mL of solution the night before and morning of surgery 10/19/23   Yazmin Curiel PA-C   Combigan 0.2-0.5 % ophthalmic solution Administer 1 drop into both eyes 2 times a day. 1/5/23   Historical Provider, MD   losartan (Cozaar) 50 mg tablet Take 1 tablet (50 mg) by mouth once daily. 6/24/23   Historical Provider, MD   rosuvastatin (Crestor) 5 mg tablet Take 1 tablet (5 mg) by mouth once daily. 5/29/23   Historical Provider, MD   zolpidem (Ambien) 10 mg tablet Take 1 tablet (10 mg) by mouth as needed at bedtime. 8/10/23   Historical Provider, MD ROSALES ROS:   Constitutional:    no fever   no chills   no unexpected weight change  Neuro/Psych:    no numbness   no weakness   no light-headedness   no confusion  Eyes:    Legally blind   no discharge   no  pain   vision loss   no diplopia   no visual disturbance  Ears:    no ear pain   no hearing loss   no tinnitus  Nose:    no nasal discharge   no sinus congestion   no epistaxis  Mouth:    no dental issues   no mouth pain   no oral bleeding   no mouth lesions  Throat:    no throat pain   no dysphagia  Neck:    no neck pain   no neck stiffness  Cardio:    no chest pain   no palpitations   no peripheral edema   no dyspnea   no KENNEY  Respiratory:    no cough   no wheezing   no hemoptysis   no shortness of breath  Endocrine:    no cold intolerance   no heat intolerance  GI:    no abdominal distention   no abdominal pain   no constipation   no diarrhea   no nausea   no vomiting   no blood in stool  :    no difficulty urinating   no dysuria   no oliguria  Musculoskeletal:    no arthralgias   no myalgias   no decreased ROM  Hematologic:    does not bruise/bleed easily   no excessive bleeding   no history of blood transfusion   no blood clots  Skin:   no skin changes   no sores/wound   no rash      Physical Exam  Constitutional:       General: He is not in acute distress.     Appearance: Normal appearance.   HENT:      Head: Normocephalic and atraumatic.      Nose: Nose normal. No congestion.      Mouth/Throat:      Mouth: Mucous membranes are moist.      Pharynx: No posterior oropharyngeal erythema.   Eyes:      Extraocular Movements: Extraocular movements intact.      Conjunctiva/sclera: Conjunctivae normal.      Comments: Legally blind   Cardiovascular:      Rate and Rhythm: Normal rate and regular rhythm.      Pulses: Normal pulses.      Heart sounds: Normal heart sounds. No murmur heard.     Comments: Functional 4 Mets. Patient denies SOB walking up 2 flights of stairs   3 days a week at gym; free weights and machines    Pulmonary:      Effort: Pulmonary effort is normal. No respiratory distress.      Breath sounds: Normal breath sounds. No stridor. No wheezing.   Abdominal:      General: Bowel sounds  "are normal.      Palpations: Abdomen is soft. There is no mass.      Tenderness: There is no abdominal tenderness. There is no rebound.   Musculoskeletal:         General: Normal range of motion.      Cervical back: Normal range of motion and neck supple.   Skin:     General: Skin is warm and dry.   Neurological:      General: No focal deficit present.      Mental Status: He is alert and oriented to person, place, and time.   Psychiatric:         Mood and Affect: Mood normal.         Behavior: Behavior normal.          PAT AIRWAY:   Airway:     Mallampati::  II    Neck ROM::  Full   multiple missing teeth      Vitals:  Visit Vitals  /80   Pulse 78   Temp 34.2 °C (93.6 °F)   Resp 18   Ht 1.778 m (5' 10\")   Wt 92.4 kg (203 lb 11.3 oz)   SpO2 99%   BMI 29.23 kg/m²   Smoking Status Never   BSA 2.14 m²        DASI Risk Score    No data to display       Caprini DVT Assessment    No data to display       Modified Frailty Index    No data to display       CHADS2 Stroke Risk  Current as of 7 hours ago        N/A 3 - 100%: High Risk   2 - 3%: Medium Risk   0 - 2%: Low Risk     Last Change: N/A          This score determines the patient's risk of having a stroke if the patient has atrial fibrillation.        This score is not applicable to this patient. Components are not calculated.          Revised Cardiac Risk Index    No data to display       Apfel Simplified Score    No data to display       Risk Analysis Index Results This Encounter    No data found in the last 1 encounters.       Lab Results   Component Value Date    WBC 5.8 10/19/2023    HGB 13.5 10/19/2023    HCT 39.4 (L) 10/19/2023    MCV 82 10/19/2023     10/19/2023      Lab Results   Component Value Date    GLUCOSE 238 (H) 10/19/2023    CALCIUM 9.3 10/19/2023     10/19/2023    K 4.0 10/19/2023    CO2 28 10/19/2023     10/19/2023    BUN 30 (H) 10/19/2023    CREATININE 1.34 (H) 10/19/2023        EKG 10/19/23  NSR  Normal EKG  Vent rate = 76 " bpm    Assessment and Plan:   Patient is a 53-year-old male scheduled for a Insertion Inflatable Penile Prosthesis with Dr. Patel on 10/26/23.  Patient has no active cardiac symptoms.   Patient denies any chest pain, tightness, heaviness, pressure, radiating pain, palpitations, irregular heartbeats, lightheadedness, cough, congestion, shortness of breath, KENNEY, PND, near syncope, weight loss or gain.     RCRI  1 (type of surgery)  , 6 % Risk of MACE    Cardiac:  HTN - hold losartan 24 hours before surgery  Encouraged lifestyle modifications, low-sodium diet, and increase activity as tolerated.  Monitor BP and follow up with managing physician for readings sustaining >140/90.    HLD - cont statin on dos     Endocrine:  DMII - pt not currently on any medications for this condition  Lab Results   Component Value Date    HGBA1C 6.3 (H) 08/17/2023   Nonfasting blood glucose today is 238mg/dL will repeat HgbA1c as pt is not currently prescribed any meds for his diabetes    Renal:  CKDIIIB  7/7/23 Crt 1.9; GFR 42  10/19/23  Crt 1.34; Gfr 63    Hematology:  Anemia  10/19/23 H/H 13.5/39.4%    Patient instructed to ambulate as soon as possible postoperatively to decrease thromboembolic risk.   Initiate mechanical DVT prophylaxis as soon as possible and initiate chemical prophylaxis when deemed safe from a bleeding standpoint post surgery.     LABS: CBC, BMP, T&S, MRSA, Ucx and EKG ordered. Labs reviewed and show stable CKD and anemia and elevated nonfasting blood glucose of 238; HgbA1c added on.     Followup: Ucx, HgbA1c and MRSA pending    STOP BANG: male, >50, over weight = 3    Caprini: 3    Risk assessment complete.  Patient is scheduled for a low to intermediate surgical risk procedure.        Preoperative medication instructions were provided and reviewed with the patient.  Any additional testing or evaluation was explained to the patient.  Nothing by mouth instructions were discussed and patient's questions  were answered prior to conclusion to this encounter.  Patient verbalized understanding of preoperative instructions given in preadmission testing; discharge instructions available in EMR.    This note was dictated by a speech recognition.  Minor errors may have been detected in a speech recognition.

## 2023-10-19 NOTE — PREPROCEDURE INSTRUCTIONS
Medication List            Accurate as of October 19, 2023 10:34 AM. Always use your most recent med list.                brimonidine 0.2 % ophthalmic solution  Commonly known as: AlphaGAN  Medication Adjustments for Surgery: Take morning of surgery with sip of water, no other fluids     chlorhexidine 0.12 % solution  Commonly known as: Peridex  Swish for 30 seconds and spit 15mL of solution the night before and morning of surgery     Combigan 0.2-0.5 % ophthalmic solution  Generic drug: brimonidine-timoloL  Medication Adjustments for Surgery: Take morning of surgery with sip of water, no other fluids     losartan 50 mg tablet  Commonly known as: Cozaar  Medication Adjustments for Surgery: Stop 1 day before surgery     rosuvastatin 5 mg tablet  Commonly known as: Crestor  Medication Adjustments for Surgery: Take morning of surgery with sip of water, no other fluids     zolpidem 10 mg tablet  Commonly known as: Ambien  Medication Adjustments for Surgery: Take morning of surgery with sip of water, no other fluids                        PAT DISCHARGE INSTRUCTIONS    Pre-surgery COVID-19 testing: We want to perform your surgery in the safest possible way to give you the best chance of a smooth recovery. One of our healthcare members will reach out to you 5-7 days prior to your procedure/surgery to schedule you for COVID testing. This testing must be done 2-3 days before your procedure/surgery even if you do not show symptoms consistent with the virus.   Self-Quarantine -Avoid contact with others or leaving the home except for a doctor’s appointment AFTER your COVID test.   Check for symptoms daily prior to surgery and notify us if you have any of the following    Fever = 38.0 C (100.4 F)     New respiratory symptoms (e.g. cough, shortness of breath, respiratory distress, sore throat)   Recent loss of taste or smell   Flu like symptoms such as headache, fatigue or gastrointestinal symptoms      Please let your surgeon  know if:      You develop any open sores, shingles, burning or painful urination as these may increase your risk of an infection.   You no longer wish to have the surgery.   Any other personal circumstances change that may lead to the need to cancel or defer this surgery-such as being sick or getting admitted to any hospital within one week of your planned procedure.    Your contact details change, such as a change of address or phone number.    Starting now:     Stop smoking. It is well known that quitting smoking can make a huge difference to your health and recovery from surgery. The longer you abstain from smoking, the better your chances of a healthy recovery. If you need help with quitting, call 2-120-QUIT-NOW to be connected to a trained counselor who will discuss the best methods to help you quit.       One week before your surgery:     Please stop all supplements 7 days prior to surgery. Vitamins A, C and E must be stopped for 7 days but Vitamins B and D may be continued till the day before surgery.    Please stop taking NSAID pain medicine such as Advil and Motrin 7 days before surgery.    If you develop any fever, cough, cold, rashes, cuts, scratches, scrapes, urinary symptoms or infection anywhere on your body (including teeth and gums) prior to surgery, please call your surgeon’s office as soon as possible. This may require treatment to reduce the chance of cancellation on the day of surgery.    The day before your surgery:     DIET- Do not eat any solid food after midnight the night before surgery. Clear liquids (water, tea, black coffee and apple juice) are acceptable up to 2 hours before your surgery.    Get a good night’s rest. Use the special soap for bathing if you have been instructed to use one.    Arrival time is typically 2 hours prior to the time of surgery. The Pre-operative nursing team will call between the hours of 2 p.m. and 6 p.m. the business day before your surgery to provide you with  your arrival time. If you have not received a phone call by 6 p.m., please call [phone] for assistance.    Scheduled surgery times may change and you will be notified if this occurs - please check your personal voicemail for any updates.    In the event of an illness or the need to cancel your surgery - Please notify your surgeon and/or [facility] operative services [phone].    On the morning of surgery:   Wear comfortable, loose fitting clothes which open in the front. Please do not wear moisturizers, creams, lotions or perfume.    Please bring with you to surgery:   Photo ID and insurance card   Current list of medicines and allergies   Pacemaker/ Defibrillator/Heart stent cards   CPAP machine and mask    Slings/ splints/ crutches   A copy of your complete advanced directive/DHPOA.    Please do NOT bring with you to surgery:   All jewelry and valuables should be left at home.   Prosthetic devices such as contact lenses, hearing aids, dentures, eyelash extensions, hairpins and body piercings must be removed prior to going in to the surgical suite.    Parking and where to go when you arrive:      Free  parking is available in the front of the building for all patients scheduled for surgery. Please check in at the desk just behind the main entrance and let them know you are here for surgery and you will be directed to the 2nd floor operating suite.    After outpatient surgery:   A responsible adult MUST accompany you at the time of discharge and stay with you for 24 hours after your surgery. You may NOT drive yourself home after surgery.    Do not drive, operate machinery, make critical decisions or do activities that require co-ordination or balance until after a night’s sleep.   Do not drink alcoholic beverages for 24 hours.   Instructions for resuming your medications will be provided by your surgeon.      CALL YOUR DOCTOR AFTER SURGERY IF YOU HAVE:     Chills and/or a fever of 101° F or  higher.    Redness, swelling, pus or drainage from your surgical wound or a bad smell from the wound.    Lightheadedness, fainting or confusion.    Persistent vomiting (throwing up) and are not able to eat or drink for 12 hours.    Three or more loose, watery bowel movements in 24 hours (diarrhea).   Difficulty or pain while urinating( after non-urological surgery)    Pain and swelling in your legs, especially if it is only on one side.    Difficulty breathing or are breathing faster than normal.    Any new concerning symptoms.

## 2023-10-19 NOTE — CPM/PAT H&P
[facility] Evaluation       Name: Neno Chew (Neno Chew)  /Age: 1970/53 y.o.         Date of Consult: 10/19/23    Referring Provider: Dr. Patel    Surgery, Date, and Length: Insertion Inflatable Penile Prosthesis , 10/26/23, 150MIN    Neno Chew is a 53 year-old female who presents to the [facility] for perioperative risk assessment prior to surgery.    Patient presents with a primary diagnosis of erectile dysfunction  x 25 years.  He has tried PDE-5i which provided no benefit.  He has not tried intracavernosal injections to date, due to blindness.  Of note, he is s/p multiple eye surgeries and is now legally blind as a result of complications of diabetes.      This note was created in part upon personal review of patient's medical records.      Patient is scheduled to have Insertion Inflatable Penile Prosthesis       Pt denies any past history of anesthetic complications such as PONV, awareness, prolonged sedation, dental damage, aspiration, cardiac arrest, difficult intubation, difficult I.V. access or unexpected hospital admissions.  NO malignant hyperthermia and or pseudocholinesterase deficiency.  No history of blood transfusions     The patient is not a Zoroastrian and will accept blood and blood products if medically indicated.   Type and screen sent.     Past Medical History:   Diagnosis Date    Adjustment disorder     Blindness     Carpal tunnel syndrome, bilateral     CKD (chronic kidney disease)     stage 3, 23: creatinine 1.7, GFR 48    Diabetes mellitus (CMS/[facility])     23 HgbA1C 6.3%    Diabetic neuropathy (CMS/[facility])     Hyperlipidemia     Hypertension     Insomnia        Past Surgical History:   Procedure Laterality Date    APPENDECTOMY      CATARACT EXTRACTION Bilateral     COLONOSCOPY      TOE AMPUTATION Left     left great toe, skin grafting of 3 large wounds over dorsum of left foot    VASECTOMY         Patient  has no history on file for sexual  activity.    No family history on file.    Social History     Socioeconomic History    Marital status:      Spouse name: Not on file    Number of children: Not on file    Years of education: Not on file    Highest education level: Not on file   Occupational History    Not on file   Tobacco Use    Smoking status: Never    Smokeless tobacco: Never   Substance and Sexual Activity    Alcohol use: Never    Drug use: Never    Sexual activity: Not on file   Other Topics Concern    Not on file   Social History Narrative    Not on file     Social Determinants of Health     Financial Resource Strain: Not on file   Food Insecurity: Not on file   Transportation Needs: Not on file   Physical Activity: Not on file   Stress: Not on file   Social Connections: Not on file   Intimate Partner Violence: Not on file   Housing Stability: Not on file        No Known Allergies    Prior to Admission medications    Medication Sig Start Date End Date Taking? Authorizing Provider   brimonidine (AlphaGAN P) 0.2 % ophthalmic solution INSTILL 1 DROP INTO INTO LEFT EYE TWICE A DAY 8/4/23   Historical Provider, MD   chlorhexidine (Peridex) 0.12 % solution Swish for 30 seconds and spit 15mL of solution the night before and morning of surgery 10/19/23   Yazmin Curiel PA-C   Combigan 0.2-0.5 % ophthalmic solution Administer 1 drop into both eyes 2 times a day. 1/5/23   Historical Provider, MD   losartan (Cozaar) 50 mg tablet Take 1 tablet (50 mg) by mouth once daily. 6/24/23   Historical Provider, MD   rosuvastatin (Crestor) 5 mg tablet Take 1 tablet (5 mg) by mouth once daily. 5/29/23   Historical Provider, MD   zolpidem (Ambien) 10 mg tablet Take 1 tablet (10 mg) by mouth as needed at bedtime. 8/10/23   Historical Provider, MD ROSALES ROS:   Constitutional:    no fever   no chills   no unexpected weight change  Neuro/Psych:    no numbness   no weakness   no light-headedness   no confusion  Eyes:    Legally blind   no discharge   no  pain   vision loss   no diplopia   no visual disturbance  Ears:    no ear pain   no hearing loss   no tinnitus  Nose:    no nasal discharge   no sinus congestion   no epistaxis  Mouth:    no dental issues   no mouth pain   no oral bleeding   no mouth lesions  Throat:    no throat pain   no dysphagia  Neck:    no neck pain   no neck stiffness  Cardio:    no chest pain   no palpitations   no peripheral edema   no dyspnea   no KENNEY  Respiratory:    no cough   no wheezing   no hemoptysis   no shortness of breath  Endocrine:    no cold intolerance   no heat intolerance  GI:    no abdominal distention   no abdominal pain   no constipation   no diarrhea   no nausea   no vomiting   no blood in stool  :    no difficulty urinating   no dysuria   no oliguria  Musculoskeletal:    no arthralgias   no myalgias   no decreased ROM  Hematologic:    does not bruise/bleed easily   no excessive bleeding   no history of blood transfusion   no blood clots  Skin:   no skin changes   no sores/wound   no rash      Physical Exam  Constitutional:       General: He is not in acute distress.     Appearance: Normal appearance.   HENT:      Head: Normocephalic and atraumatic.      Nose: Nose normal. No congestion.      Mouth/Throat:      Mouth: Mucous membranes are moist.      Pharynx: No posterior oropharyngeal erythema.   Eyes:      Extraocular Movements: Extraocular movements intact.      Conjunctiva/sclera: Conjunctivae normal.      Comments: Legally blind   Cardiovascular:      Rate and Rhythm: Normal rate and regular rhythm.      Pulses: Normal pulses.      Heart sounds: Normal heart sounds. No murmur heard.     Comments: Functional 4 Mets. Patient denies SOB walking up 2 flights of stairs   3 days a week at gym; free weights and machines    Pulmonary:      Effort: Pulmonary effort is normal. No respiratory distress.      Breath sounds: Normal breath sounds. No stridor. No wheezing.   Abdominal:      General: Bowel sounds  "are normal.      Palpations: Abdomen is soft. There is no mass.      Tenderness: There is no abdominal tenderness. There is no rebound.   Musculoskeletal:         General: Normal range of motion.      Cervical back: Normal range of motion and neck supple.   Skin:     General: Skin is warm and dry.   Neurological:      General: No focal deficit present.      Mental Status: He is alert and oriented to person, place, and time.   Psychiatric:         Mood and Affect: Mood normal.         Behavior: Behavior normal.          PAT AIRWAY:   Airway:     Mallampati::  II    Neck ROM::  Full   multiple missing teeth      Vitals:  Visit Vitals  /80   Pulse 78   Temp 34.2 °C (93.6 °F)   Resp 18   Ht 1.778 m (5' 10\")   Wt 92.4 kg (203 lb 11.3 oz)   SpO2 99%   BMI 29.23 kg/m²   Smoking Status Never   BSA 2.14 m²        DASI Risk Score    No data to display       Caprini DVT Assessment    No data to display       Modified Frailty Index    No data to display       CHADS2 Stroke Risk  Current as of 7 hours ago        N/A 3 - 100%: High Risk   2 - 3%: Medium Risk   0 - 2%: Low Risk     Last Change: N/A          This score determines the patient's risk of having a stroke if the patient has atrial fibrillation.        This score is not applicable to this patient. Components are not calculated.          Revised Cardiac Risk Index    No data to display       Apfel Simplified Score    No data to display       Risk Analysis Index Results This Encounter    No data found in the last 1 encounters.       Lab Results   Component Value Date    WBC 5.8 10/19/2023    HGB 13.5 10/19/2023    HCT 39.4 (L) 10/19/2023    MCV 82 10/19/2023     10/19/2023      Lab Results   Component Value Date    GLUCOSE 238 (H) 10/19/2023    CALCIUM 9.3 10/19/2023     10/19/2023    K 4.0 10/19/2023    CO2 28 10/19/2023     10/19/2023    BUN 30 (H) 10/19/2023    CREATININE 1.34 (H) 10/19/2023        EKG 10/19/23  NSR  Normal EKG  Vent rate = 76 " bpm    Assessment and Plan:   Patient is a 53-year-old male scheduled for a Insertion Inflatable Penile Prosthesis with Dr. Patel on 10/26/23.  Patient has no active cardiac symptoms.   Patient denies any chest pain, tightness, heaviness, pressure, radiating pain, palpitations, irregular heartbeats, lightheadedness, cough, congestion, shortness of breath, KENNEY, PND, near syncope, weight loss or gain.     RCRI  1 (type of surgery)  , 6 % Risk of MACE    Cardiac:  HTN - hold losartan 24 hours before surgery  Encouraged lifestyle modifications, low-sodium diet, and increase activity as tolerated.  Monitor BP and follow up with managing physician for readings sustaining >140/90.    HLD - cont statin on dos     Endocrine:  DMII - pt not currently on any medications for this condition  Lab Results   Component Value Date    HGBA1C 6.3 (H) 08/17/2023   Nonfasting blood glucose today is 238mg/dL will repeat HgbA1c as pt is not currently prescribed any meds for his diabetes    Renal:  CKDIIIB  7/7/23 Crt 1.9; GFR 42  10/19/23  Crt 1.34; Gfr 63    Hematology:  Anemia  10/19/23 H/H 13.5/39.4%    Patient instructed to ambulate as soon as possible postoperatively to decrease thromboembolic risk.   Initiate mechanical DVT prophylaxis as soon as possible and initiate chemical prophylaxis when deemed safe from a bleeding standpoint post surgery.     LABS: CBC, BMP, T&S, MRSA, Ucx and EKG ordered. Labs reviewed and show stable CKD and anemia and elevated nonfasting blood glucose of 238; HgbA1c added on.     Followup: Ucx, HgbA1c and MRSA pending    STOP BANG: male, >50, over weight = 3    Caprini: 3    Risk assessment complete.  Patient is scheduled for a low to intermediate surgical risk procedure.        Preoperative medication instructions were provided and reviewed with the patient.  Any additional testing or evaluation was explained to the patient.  Nothing by mouth instructions were discussed and patient's questions  were answered prior to conclusion to this encounter.  Patient verbalized understanding of preoperative instructions given in preadmission testing; discharge instructions available in EMR.    This note was dictated by a speech recognition.  Minor errors may have been detected in a speech recognition.

## 2023-10-19 NOTE — PREPROCEDURE INSTRUCTIONS
Medication List            Accurate as of October 19, 2023 11:03 AM. Always use your most recent med list.                brimonidine 0.2 % ophthalmic solution  Commonly known as: AlphaGAN  Medication Adjustments for Surgery: Take morning of surgery with sip of water, no other fluids     chlorhexidine 0.12 % solution  Commonly known as: Peridex  Swish for 30 seconds and spit 15mL of solution the night before and morning of surgery     Combigan 0.2-0.5 % ophthalmic solution  Generic drug: brimonidine-timoloL  Medication Adjustments for Surgery: Take morning of surgery with sip of water, no other fluids     losartan 50 mg tablet  Commonly known as: Cozaar  Medication Adjustments for Surgery: Stop 1 day before surgery     multivitamin tablet  Medication Adjustments for Surgery: Stop 7 days before surgery     rosuvastatin 5 mg tablet  Commonly known as: Crestor  Medication Adjustments for Surgery: Take morning of surgery with sip of water, no other fluids     TESTOSTERONE (BULK) MISC  Medication Adjustments for Surgery: Stop 7 days before surgery     zolpidem 10 mg tablet  Commonly known as: Ambien  Medication Adjustments for Surgery: Take morning of surgery with sip of water, no other fluids                      PAT DISCHARGE INSTRUCTIONS    Pre-surgery COVID-19 testing: We want to perform your surgery in the safest possible way to give you the best chance of a smooth recovery. One of our healthcare members will reach out to you 5-7 days prior to your procedure/surgery to schedule you for COVID testing. This testing must be done 2-3 days before your procedure/surgery even if you do not show symptoms consistent with the virus.   Self-Quarantine -Avoid contact with others or leaving the home except for a doctor’s appointment AFTER your COVID test.   Check for symptoms daily prior to surgery and notify us if you have any of the following    Fever = 38.0 C (100.4 F)     New respiratory symptoms (e.g. cough, shortness of  breath, respiratory distress, sore throat)   Recent loss of taste or smell   Flu like symptoms such as headache, fatigue or gastrointestinal symptoms      Please let your surgeon know if:      You develop any open sores, shingles, burning or painful urination as these may increase your risk of an infection.   You no longer wish to have the surgery.   Any other personal circumstances change that may lead to the need to cancel or defer this surgery-such as being sick or getting admitted to any hospital within one week of your planned procedure.    Your contact details change, such as a change of address or phone number.    Starting now:     Stop smoking. It is well known that quitting smoking can make a huge difference to your health and recovery from surgery. The longer you abstain from smoking, the better your chances of a healthy recovery. If you need help with quitting, call 9-800-QUIT-NOW to be connected to a trained counselor who will discuss the best methods to help you quit.       One week before your surgery:     Please stop all supplements 7 days prior to surgery. Vitamins A, C and E must be stopped for 7 days but Vitamins B and D may be continued till the day before surgery.    Please stop taking NSAID pain medicine such as Advil and Motrin 7 days before surgery.    If you develop any fever, cough, cold, rashes, cuts, scratches, scrapes, urinary symptoms or infection anywhere on your body (including teeth and gums) prior to surgery, please call your surgeon’s office as soon as possible. This may require treatment to reduce the chance of cancellation on the day of surgery.    The day before your surgery:     DIET- Do not eat any solid food after midnight the night before surgery. Clear liquids (water, tea, black coffee and apple juice) are acceptable up to 2 hours before your surgery.    Get a good night’s rest. Use the special soap for bathing if you have been instructed to use one.    Arrival time is  typically 2 hours prior to the time of surgery. The Pre-operative nursing team will call between the hours of 2 p.m. and 6 p.m. the business day before your surgery to provide you with your arrival time. If you have not received a phone call by 6 p.m., please call [phone] for assistance.    Scheduled surgery times may change and you will be notified if this occurs - please check your personal voicemail for any updates.    In the event of an illness or the need to cancel your surgery - Please notify your surgeon and/or [facility] operative services [phone].    On the morning of surgery:   Wear comfortable, loose fitting clothes which open in the front. Please do not wear moisturizers, creams, lotions or perfume.    Please bring with you to surgery:   Photo ID and insurance card   Current list of medicines and allergies   Pacemaker/ Defibrillator/Heart stent cards   CPAP machine and mask    Slings/ splints/ crutches   A copy of your complete advanced directive/DHPOA.    Please do NOT bring with you to surgery:   All jewelry and valuables should be left at home.   Prosthetic devices such as contact lenses, hearing aids, dentures, eyelash extensions, hairpins and body piercings must be removed prior to going in to the surgical suite.    Parking and where to go when you arrive:      Free  parking is available in the front of the building for all patients scheduled for surgery. Please check in at the desk just behind the main entrance and let them know you are here for surgery and you will be directed to the 2nd floor operating suite.    After outpatient surgery:   A responsible adult MUST accompany you at the time of discharge and stay with you for 24 hours after your surgery. You may NOT drive yourself home after surgery.    Do not drive, operate machinery, make critical decisions or do activities that require co-ordination or balance until after a night’s sleep.   Do not drink alcoholic  beverages for 24 hours.   Instructions for resuming your medications will be provided by your surgeon.      CALL YOUR DOCTOR AFTER SURGERY IF YOU HAVE:     Chills and/or a fever of 101° F or higher.    Redness, swelling, pus or drainage from your surgical wound or a bad smell from the wound.    Lightheadedness, fainting or confusion.    Persistent vomiting (throwing up) and are not able to eat or drink for 12 hours.    Three or more loose, watery bowel movements in 24 hours (diarrhea).   Difficulty or pain while urinating( after non-urological surgery)    Pain and swelling in your legs, especially if it is only on one side.    Difficulty breathing or are breathing faster than normal.    Any new concerning symptoms.

## 2023-10-20 LAB
ATRIAL RATE: 76 BPM
P AXIS: 62 DEGREES
P OFFSET: 191 MS
P ONSET: 137 MS
PR INTERVAL: 146 MS
Q ONSET: 210 MS
QRS COUNT: 12 BEATS
QRS DURATION: 98 MS
QT INTERVAL: 386 MS
QTC CALCULATION(BAZETT): 434 MS
QTC FREDERICIA: 417 MS
R AXIS: 39 DEGREES
T AXIS: 21 DEGREES
T OFFSET: 403 MS
VENTRICULAR RATE: 76 BPM

## 2023-10-21 LAB
BACTERIA UR CULT: NORMAL
STAPHYLOCOCCUS SPEC CULT: NORMAL

## 2023-10-24 ENCOUNTER — APPOINTMENT (OUTPATIENT)
Dept: UROLOGY | Facility: CLINIC | Age: 53
End: 2023-10-24
Payer: COMMERCIAL

## 2023-10-25 ENCOUNTER — ANESTHESIA EVENT (OUTPATIENT)
Dept: OPERATING ROOM | Facility: HOSPITAL | Age: 53
End: 2023-10-25
Payer: MEDICARE

## 2023-10-26 ENCOUNTER — ANESTHESIA (OUTPATIENT)
Dept: OPERATING ROOM | Facility: HOSPITAL | Age: 53
End: 2023-10-26
Payer: MEDICARE

## 2023-10-26 ENCOUNTER — HOSPITAL ENCOUNTER (OUTPATIENT)
Facility: HOSPITAL | Age: 53
Setting detail: OUTPATIENT SURGERY
Discharge: HOME | End: 2023-10-26
Attending: UROLOGY | Admitting: UROLOGY
Payer: MEDICARE

## 2023-10-26 VITALS
OXYGEN SATURATION: 92 % | TEMPERATURE: 97.2 F | SYSTOLIC BLOOD PRESSURE: 164 MMHG | BODY MASS INDEX: 29.19 KG/M2 | HEART RATE: 73 BPM | DIASTOLIC BLOOD PRESSURE: 81 MMHG | HEIGHT: 70 IN | WEIGHT: 203.93 LBS | RESPIRATION RATE: 12 BRPM

## 2023-10-26 DIAGNOSIS — N52.02 CORPORO-VENOUS OCCLUSIVE ERECTILE DYSFUNCTION: Primary | ICD-10-CM

## 2023-10-26 PROBLEM — I10 HTN (HYPERTENSION): Status: ACTIVE | Noted: 2023-10-26

## 2023-10-26 PROBLEM — E11.9 DIABETES MELLITUS, TYPE 2 (MULTI): Status: ACTIVE | Noted: 2023-10-26

## 2023-10-26 LAB
ABO GROUP (TYPE) IN BLOOD: NORMAL
GLUCOSE BLD MANUAL STRIP-MCNC: 200 MG/DL (ref 74–99)
GLUCOSE BLD MANUAL STRIP-MCNC: 220 MG/DL (ref 74–99)
RH FACTOR (ANTIGEN D): NORMAL

## 2023-10-26 PROCEDURE — 2500000001 HC RX 250 WO HCPCS SELF ADMINISTERED DRUGS (ALT 637 FOR MEDICARE OP): Performed by: UROLOGY

## 2023-10-26 PROCEDURE — 7100000010 HC PHASE TWO TIME - EACH INCREMENTAL 1 MINUTE: Performed by: UROLOGY

## 2023-10-26 PROCEDURE — 2500000004 HC RX 250 GENERAL PHARMACY W/ HCPCS (ALT 636 FOR OP/ED): Performed by: NURSE ANESTHETIST, CERTIFIED REGISTERED

## 2023-10-26 PROCEDURE — 2500000005 HC RX 250 GENERAL PHARMACY W/O HCPCS: Performed by: UROLOGY

## 2023-10-26 PROCEDURE — 82947 ASSAY GLUCOSE BLOOD QUANT: CPT

## 2023-10-26 PROCEDURE — 3600000008 HC OR TIME - EACH INCREMENTAL 1 MINUTE - PROCEDURE LEVEL THREE: Performed by: UROLOGY

## 2023-10-26 PROCEDURE — 7100000009 HC PHASE TWO TIME - INITIAL BASE CHARGE: Performed by: UROLOGY

## 2023-10-26 PROCEDURE — 3700000002 HC GENERAL ANESTHESIA TIME - EACH INCREMENTAL 1 MINUTE: Performed by: UROLOGY

## 2023-10-26 PROCEDURE — 2500000004 HC RX 250 GENERAL PHARMACY W/ HCPCS (ALT 636 FOR OP/ED): Performed by: UROLOGY

## 2023-10-26 PROCEDURE — 36415 COLL VENOUS BLD VENIPUNCTURE: CPT | Performed by: UROLOGY

## 2023-10-26 PROCEDURE — 2500000005 HC RX 250 GENERAL PHARMACY W/O HCPCS: Performed by: NURSE ANESTHETIST, CERTIFIED REGISTERED

## 2023-10-26 PROCEDURE — 2780000003 HC OR 278 NO HCPCS: Performed by: UROLOGY

## 2023-10-26 PROCEDURE — 54405 INSERT MULTI-COMP PENIS PROS: CPT | Performed by: UROLOGY

## 2023-10-26 PROCEDURE — C1813 PROSTHESIS, PENILE, INFLATAB: HCPCS | Performed by: UROLOGY

## 2023-10-26 PROCEDURE — 3700000001 HC GENERAL ANESTHESIA TIME - INITIAL BASE CHARGE: Performed by: UROLOGY

## 2023-10-26 PROCEDURE — 2500000001 HC RX 250 WO HCPCS SELF ADMINISTERED DRUGS (ALT 637 FOR MEDICARE OP): Performed by: ANESTHESIOLOGY

## 2023-10-26 PROCEDURE — A4217 STERILE WATER/SALINE, 500 ML: HCPCS | Performed by: UROLOGY

## 2023-10-26 PROCEDURE — 2500000004 HC RX 250 GENERAL PHARMACY W/ HCPCS (ALT 636 FOR OP/ED): Performed by: ANESTHESIOLOGY

## 2023-10-26 PROCEDURE — 2720000007 HC OR 272 NO HCPCS: Performed by: UROLOGY

## 2023-10-26 PROCEDURE — A54401 PR INSERT SELF-CONTD PENILE PROSTHESIS: Performed by: ANESTHESIOLOGY

## 2023-10-26 PROCEDURE — 3600000003 HC OR TIME - INITIAL BASE CHARGE - PROCEDURE LEVEL THREE: Performed by: UROLOGY

## 2023-10-26 PROCEDURE — 54235 NJX CORPORA CAVERNOSA RX AGT: CPT | Performed by: UROLOGY

## 2023-10-26 PROCEDURE — A54401 PR INSERT SELF-CONTD PENILE PROSTHESIS: Performed by: NURSE ANESTHETIST, CERTIFIED REGISTERED

## 2023-10-26 PROCEDURE — 7100000001 HC RECOVERY ROOM TIME - INITIAL BASE CHARGE: Performed by: UROLOGY

## 2023-10-26 PROCEDURE — 7100000002 HC RECOVERY ROOM TIME - EACH INCREMENTAL 1 MINUTE: Performed by: UROLOGY

## 2023-10-26 DEVICE — RESERVOIR, TITAN CL, WITH LOCK OUT VALVE, 125CC: Type: IMPLANTABLE DEVICE | Site: PENIS | Status: FUNCTIONAL

## 2023-10-26 DEVICE — IMPLANTABLE DEVICE: Type: IMPLANTABLE DEVICE | Site: PENIS | Status: FUNCTIONAL

## 2023-10-26 RX ORDER — SULFAMETHOXAZOLE AND TRIMETHOPRIM 800; 160 MG/1; MG/1
1 TABLET ORAL 2 TIMES DAILY
Qty: 14 TABLET | Refills: 0 | Status: SHIPPED | OUTPATIENT
Start: 2023-10-26 | End: 2023-11-02

## 2023-10-26 RX ORDER — OXYCODONE HYDROCHLORIDE 5 MG/1
5 TABLET ORAL EVERY 6 HOURS PRN
Qty: 28 TABLET | Refills: 0 | Status: SHIPPED | OUTPATIENT
Start: 2023-10-26 | End: 2023-10-26 | Stop reason: SDUPTHER

## 2023-10-26 RX ORDER — GABAPENTIN 300 MG/1
600 CAPSULE ORAL ONCE
Status: DISCONTINUED | OUTPATIENT
Start: 2023-10-26 | End: 2023-10-26 | Stop reason: HOSPADM

## 2023-10-26 RX ORDER — POLYETHYLENE GLYCOL 3350 17 G/17G
17 POWDER, FOR SOLUTION ORAL DAILY
Qty: 10 PACKET | Refills: 0 | Status: SHIPPED | OUTPATIENT
Start: 2023-10-26 | End: 2023-11-05

## 2023-10-26 RX ORDER — HYDRALAZINE HYDROCHLORIDE 20 MG/ML
5 INJECTION INTRAMUSCULAR; INTRAVENOUS EVERY 30 MIN PRN
Status: DISCONTINUED | OUTPATIENT
Start: 2023-10-26 | End: 2023-10-26 | Stop reason: HOSPADM

## 2023-10-26 RX ORDER — LIDOCAINE HYDROCHLORIDE 20 MG/ML
5 JELLY TOPICAL ONCE
Status: COMPLETED | OUTPATIENT
Start: 2023-10-26 | End: 2023-10-26

## 2023-10-26 RX ORDER — OXYCODONE HYDROCHLORIDE 5 MG/1
5 TABLET ORAL EVERY 4 HOURS PRN
Status: DISCONTINUED | OUTPATIENT
Start: 2023-10-26 | End: 2023-10-26 | Stop reason: HOSPADM

## 2023-10-26 RX ORDER — CELECOXIB 200 MG/1
400 CAPSULE ORAL ONCE
Status: COMPLETED | OUTPATIENT
Start: 2023-10-26 | End: 2023-10-26

## 2023-10-26 RX ORDER — CHLORHEXIDINE GLUCONATE 40 MG/ML
SOLUTION TOPICAL DAILY PRN
Status: DISCONTINUED | OUTPATIENT
Start: 2023-10-26 | End: 2023-10-26 | Stop reason: HOSPADM

## 2023-10-26 RX ORDER — POLYETHYLENE GLYCOL 3350 17 G/17G
17 POWDER, FOR SOLUTION ORAL DAILY
Qty: 10 PACKET | Refills: 0 | Status: SHIPPED | OUTPATIENT
Start: 2023-10-26 | End: 2023-10-26 | Stop reason: SDUPTHER

## 2023-10-26 RX ORDER — LIDOCAINE HYDROCHLORIDE 10 MG/ML
INJECTION INFILTRATION; PERINEURAL AS NEEDED
Status: DISCONTINUED | OUTPATIENT
Start: 2023-10-26 | End: 2023-10-26 | Stop reason: HOSPADM

## 2023-10-26 RX ORDER — MIDAZOLAM HYDROCHLORIDE 1 MG/ML
INJECTION, SOLUTION INTRAMUSCULAR; INTRAVENOUS AS NEEDED
Status: DISCONTINUED | OUTPATIENT
Start: 2023-10-26 | End: 2023-10-26

## 2023-10-26 RX ORDER — DIPHENHYDRAMINE HYDROCHLORIDE 50 MG/ML
12.5 INJECTION INTRAMUSCULAR; INTRAVENOUS ONCE AS NEEDED
Status: DISCONTINUED | OUTPATIENT
Start: 2023-10-26 | End: 2023-10-26 | Stop reason: HOSPADM

## 2023-10-26 RX ORDER — PHENYLEPHRINE HCL IN 0.9% NACL 1 MG/10 ML
SYRINGE (ML) INTRAVENOUS AS NEEDED
Status: DISCONTINUED | OUTPATIENT
Start: 2023-10-26 | End: 2023-10-26

## 2023-10-26 RX ORDER — BUPIVACAINE HYDROCHLORIDE 2.5 MG/ML
INJECTION, SOLUTION EPIDURAL; INFILTRATION; INTRACAUDAL AS NEEDED
Status: DISCONTINUED | OUTPATIENT
Start: 2023-10-26 | End: 2023-10-26 | Stop reason: HOSPADM

## 2023-10-26 RX ORDER — ONDANSETRON HYDROCHLORIDE 2 MG/ML
INJECTION, SOLUTION INTRAVENOUS AS NEEDED
Status: DISCONTINUED | OUTPATIENT
Start: 2023-10-26 | End: 2023-10-26

## 2023-10-26 RX ORDER — ACETAMINOPHEN 325 MG/1
650 TABLET ORAL EVERY 6 HOURS PRN
Qty: 56 TABLET | Refills: 0 | Status: SHIPPED | OUTPATIENT
Start: 2023-10-26 | End: 2023-10-26 | Stop reason: SDUPTHER

## 2023-10-26 RX ORDER — SODIUM CHLORIDE 9 MG/ML
100 INJECTION, SOLUTION INTRAVENOUS CONTINUOUS
Status: DISCONTINUED | OUTPATIENT
Start: 2023-10-26 | End: 2023-10-26 | Stop reason: HOSPADM

## 2023-10-26 RX ORDER — ACETAMINOPHEN 325 MG/1
975 TABLET ORAL ONCE
Status: COMPLETED | OUTPATIENT
Start: 2023-10-26 | End: 2023-10-26

## 2023-10-26 RX ORDER — IBUPROFEN 600 MG/1
600 TABLET ORAL EVERY 6 HOURS PRN
Qty: 28 TABLET | Refills: 0 | Status: SHIPPED | OUTPATIENT
Start: 2023-10-26

## 2023-10-26 RX ORDER — OXYCODONE HYDROCHLORIDE 5 MG/1
5 TABLET ORAL EVERY 6 HOURS PRN
Qty: 28 TABLET | Refills: 0 | Status: SHIPPED | OUTPATIENT
Start: 2023-10-26

## 2023-10-26 RX ORDER — ACETAMINOPHEN 325 MG/1
650 TABLET ORAL EVERY 6 HOURS PRN
Qty: 56 TABLET | Refills: 0 | Status: SHIPPED | OUTPATIENT
Start: 2023-10-26

## 2023-10-26 RX ORDER — ONDANSETRON HYDROCHLORIDE 2 MG/ML
4 INJECTION, SOLUTION INTRAVENOUS ONCE AS NEEDED
Status: DISCONTINUED | OUTPATIENT
Start: 2023-10-26 | End: 2023-10-26 | Stop reason: HOSPADM

## 2023-10-26 RX ORDER — SULFAMETHOXAZOLE AND TRIMETHOPRIM 800; 160 MG/1; MG/1
1 TABLET ORAL 2 TIMES DAILY
Qty: 14 TABLET | Refills: 0 | Status: SHIPPED | OUTPATIENT
Start: 2023-10-26 | End: 2023-10-26 | Stop reason: SDUPTHER

## 2023-10-26 RX ORDER — FLUCONAZOLE 2 MG/ML
400 INJECTION, SOLUTION INTRAVENOUS ONCE
Status: COMPLETED | OUTPATIENT
Start: 2023-10-26 | End: 2023-10-26

## 2023-10-26 RX ORDER — NORETHINDRONE AND ETHINYL ESTRADIOL 0.5-0.035
KIT ORAL AS NEEDED
Status: DISCONTINUED | OUTPATIENT
Start: 2023-10-26 | End: 2023-10-26

## 2023-10-26 RX ORDER — DEXAMETHASONE SODIUM PHOSPHATE 4 MG/ML
INJECTION, SOLUTION INTRA-ARTICULAR; INTRALESIONAL; INTRAMUSCULAR; INTRAVENOUS; SOFT TISSUE AS NEEDED
Status: DISCONTINUED | OUTPATIENT
Start: 2023-10-26 | End: 2023-10-26 | Stop reason: HOSPADM

## 2023-10-26 RX ORDER — FENTANYL CITRATE 50 UG/ML
INJECTION, SOLUTION INTRAMUSCULAR; INTRAVENOUS AS NEEDED
Status: DISCONTINUED | OUTPATIENT
Start: 2023-10-26 | End: 2023-10-26

## 2023-10-26 RX ORDER — SODIUM CHLORIDE, SODIUM LACTATE, POTASSIUM CHLORIDE, CALCIUM CHLORIDE 600; 310; 30; 20 MG/100ML; MG/100ML; MG/100ML; MG/100ML
100 INJECTION, SOLUTION INTRAVENOUS CONTINUOUS
Status: DISCONTINUED | OUTPATIENT
Start: 2023-10-26 | End: 2023-10-26 | Stop reason: HOSPADM

## 2023-10-26 RX ORDER — IBUPROFEN 600 MG/1
600 TABLET ORAL EVERY 6 HOURS PRN
Qty: 28 TABLET | Refills: 0 | Status: SHIPPED | OUTPATIENT
Start: 2023-10-26 | End: 2023-10-26 | Stop reason: SDUPTHER

## 2023-10-26 RX ORDER — PROPOFOL 10 MG/ML
INJECTION, EMULSION INTRAVENOUS AS NEEDED
Status: DISCONTINUED | OUTPATIENT
Start: 2023-10-26 | End: 2023-10-26

## 2023-10-26 RX ORDER — ALBUTEROL SULFATE 0.83 MG/ML
2.5 SOLUTION RESPIRATORY (INHALATION) ONCE AS NEEDED
Status: DISCONTINUED | OUTPATIENT
Start: 2023-10-26 | End: 2023-10-26 | Stop reason: HOSPADM

## 2023-10-26 RX ADMIN — FENTANYL CITRATE 75 MCG: 50 INJECTION, SOLUTION INTRAMUSCULAR; INTRAVENOUS at 13:51

## 2023-10-26 RX ADMIN — ACETAMINOPHEN 975 MG: 325 TABLET ORAL at 10:28

## 2023-10-26 RX ADMIN — MIDAZOLAM HYDROCHLORIDE 2 MG: 1 INJECTION, SOLUTION INTRAMUSCULAR; INTRAVENOUS at 13:49

## 2023-10-26 RX ADMIN — Medication 100 MCG: at 14:45

## 2023-10-26 RX ADMIN — SODIUM CHLORIDE: 9 INJECTION, SOLUTION INTRAVENOUS at 13:30

## 2023-10-26 RX ADMIN — CELECOXIB 400 MG: 200 CAPSULE ORAL at 10:28

## 2023-10-26 RX ADMIN — LIDOCAINE HYDROCHLORIDE 1 APPLICATION: 20 JELLY TOPICAL at 17:20

## 2023-10-26 RX ADMIN — HYDROMORPHONE HYDROCHLORIDE 0.5 MG: 1 INJECTION, SOLUTION INTRAMUSCULAR; INTRAVENOUS; SUBCUTANEOUS at 16:20

## 2023-10-26 RX ADMIN — VANCOMYCIN HYDROCHLORIDE 1500 MG: 10 INJECTION, POWDER, LYOPHILIZED, FOR SOLUTION INTRAVENOUS at 10:25

## 2023-10-26 RX ADMIN — PIPERACILLIN SODIUM AND TAZOBACTAM SODIUM 3.38 G: 3; .375 INJECTION, SOLUTION INTRAVENOUS at 10:24

## 2023-10-26 RX ADMIN — PROPOFOL 25 MG: 10 INJECTION, EMULSION INTRAVENOUS at 14:23

## 2023-10-26 RX ADMIN — ONDANSETRON 4 MG: 2 INJECTION INTRAMUSCULAR; INTRAVENOUS at 15:18

## 2023-10-26 RX ADMIN — SODIUM CHLORIDE 100 ML/HR: 9 INJECTION, SOLUTION INTRAVENOUS at 10:23

## 2023-10-26 RX ADMIN — PROPOFOL 25 MG: 10 INJECTION, EMULSION INTRAVENOUS at 14:15

## 2023-10-26 RX ADMIN — Medication 100 MCG: at 14:04

## 2023-10-26 RX ADMIN — FLUCONAZOLE 400 MG: 2 INJECTION, SOLUTION INTRAVENOUS at 10:24

## 2023-10-26 RX ADMIN — PROPOFOL 25 MG: 10 INJECTION, EMULSION INTRAVENOUS at 14:20

## 2023-10-26 RX ADMIN — PROPOFOL 125 MG: 10 INJECTION, EMULSION INTRAVENOUS at 13:51

## 2023-10-26 RX ADMIN — EPHEDRINE SULFATE 10 MG: 50 INJECTION, SOLUTION INTRAVENOUS at 14:45

## 2023-10-26 RX ADMIN — FENTANYL CITRATE 25 MCG: 50 INJECTION, SOLUTION INTRAMUSCULAR; INTRAVENOUS at 14:10

## 2023-10-26 RX ADMIN — EPHEDRINE SULFATE 10 MG: 50 INJECTION, SOLUTION INTRAVENOUS at 14:06

## 2023-10-26 SDOH — HEALTH STABILITY: MENTAL HEALTH: CURRENT SMOKER: 0

## 2023-10-26 ASSESSMENT — PAIN DESCRIPTION - DESCRIPTORS: DESCRIPTORS: SHARP

## 2023-10-26 ASSESSMENT — PAIN SCALES - GENERAL
PAINLEVEL_OUTOF10: 8
PAINLEVEL_OUTOF10: 8
PAINLEVEL_OUTOF10: 3
PAINLEVEL_OUTOF10: 8
PAINLEVEL_OUTOF10: 9
PAINLEVEL_OUTOF10: 8
PAINLEVEL_OUTOF10: 5 - MODERATE PAIN
PAINLEVEL_OUTOF10: 8

## 2023-10-26 ASSESSMENT — PAIN - FUNCTIONAL ASSESSMENT
PAIN_FUNCTIONAL_ASSESSMENT: 0-10

## 2023-10-26 ASSESSMENT — COLUMBIA-SUICIDE SEVERITY RATING SCALE - C-SSRS
2. HAVE YOU ACTUALLY HAD ANY THOUGHTS OF KILLING YOURSELF?: NO
1. IN THE PAST MONTH, HAVE YOU WISHED YOU WERE DEAD OR WISHED YOU COULD GO TO SLEEP AND NOT WAKE UP?: NO
6. HAVE YOU EVER DONE ANYTHING, STARTED TO DO ANYTHING, OR PREPARED TO DO ANYTHING TO END YOUR LIFE?: NO

## 2023-10-26 NOTE — OP NOTE
Insertion Inflatable Penile Prosthesis Operative Note     Date: 10/26/2023  OR Location: [facility] OR    Name: Neno Chew, : 1970, Age: 53 y.o., MRN: [id number], Sex: male    Diagnosis  Pre-op Diagnosis     * Male erectile dysfunction, unspecified [N52.9] Post-op Diagnosis     * Male erectile dysfunction, unspecified [N52.9]     Procedures    * Insertion Inflatable Penile Prosthesis (Coloplast Titan 22cm with 1cm RTE and 125cc reservoir)  injection of penile anesthetic (dorsal penile block and bilateral pudendal nerve blocks)  Pharmacologic Injection of Penis for Artificial Erection      Surgeons      * Oc Patel - Primary    Resident/Fellow/Other Assistant:  Surgeon(s) and Role:     * Lisandra Naik MD - Resident - Assisting    Procedure Summary  Anesthesia: General  ASA: III  Anesthesia Staff: Anesthesiologist: Collin Bose MD  CRNA: Jojo Ludwig APRN-CRNA; YEHUDA Sullivan-CRNA  Estimated Blood Loss: 50mL  Intra-op Medications:   Medication Name Total Dose   bupivacaine PF (Marcaine) 0.25 % (2.5 mg/mL) injection 29 mL   lidocaine (Xylocaine) 10 mg/mL (1 %) injection 20 mL   dexAMETHasone (Decadron) injection 4 mg              Anesthesia Record               Intraprocedure I/O Totals          Intake    NaCl 0.9 % 800.00 mL    Total Intake 800 mL       Output    Urine 1200 mL    Est. Blood Loss 50 mL    Total Output 1250 mL       Net    Net Volume -450 mL          Specimen: No specimens collected     Staff:   Circulator: Kalyn Null RN; Tiffanie Lynn RN; Carolina Mackey RN  Scrub Person: Onelia Herrera; Maria Eugenia Finch         Drains and/or Catheters:   Open Drain Anterior Groin (Active)       Urethral Catheter Non-latex 16 Fr. (Active)       Tourniquet Times:         Implants:  Implants       Type Name Action Serial No.      General Urology ASSEMBLY KIT, TITAN STANDARD - SNA - [id number] Implanted NA     General Urology RESERVOIR, TITAN CL, WITH LOCK OUT VALVE, 125CC - SNA -  [id number] Implanted NA     General Urology CYLINDER PUMP SET, TITAN ZERO DEGREE, SCROTAL, 22CM - SNA - [id number] Implanted NA              Findings: no obvious abnormalities noted    Indications: Neno Chew is an 53 y.o. male who is having surgery for Male erectile dysfunction, unspecified [N52.9].     The patient was seen in the preoperative area. The risks, benefits, complications, treatment options, non-operative alternatives, expected recovery and outcomes were discussed with the patient. The possibilities of reaction to medication, pulmonary aspiration, injury to surrounding structures, bleeding, recurrent infection, the need for additional procedures, failure to diagnose a condition, and creating a complication requiring transfusion or operation were discussed with the patient. The patient concurred with the proposed plan, giving informed consent.  The site of surgery was properly noted/marked if necessary per policy. The patient has been actively warmed in preoperative area. Preoperative antibiotics have been ordered and given within 2 hours of incision. Venous thrombosis prophylaxis are not indicated.    Procedure Details:    PROCEDURE:  Placement of Coloplast Titan 3-piece inflatable penile prosthesis.     Cylinders:  22 cm     Rear tips: 1cm     Reservoir: 125 mL              Site:  Left Space of retzius    INDICATIONS:  The patient is a 53 y.o. year old male with a history of severe vasculogenic erectile dysfunction refractory to conservative management with PDE 5 inhibitors, VCD, and intracavernosal therapy. Patient decided to undergo definitive surgical management with inflatable penile prosthesis. The risks (infection, erosion, decrease penile size, damage to other organs, device mechanical failure and need for replacement, etc) and benefits were explained and all questions and concerns were addressed.    PROCEDURE:The patient was correctly identified in the preoperative holding area and informed  consent was obtained and documented.  He was examined in the preoperative area to ensure he had no scrotal or perineal rashes or skin infections. He received preoperative antibiotics in the form of vancomycin, fluconazole, and gentamicin IV per pharmacy protocol. He was then brought to the operating room and placed on the table in supine position.  After a universal timeout, SCDs were placed and after adequate induction, general anesthesia was given. The external genitalia was shaved with clippers making sure not damage the scrotal or penile skin.  A penile block was performed with a total of 10 mL of 0.5% Marcaine and Dexamethasone and 20mL of the came solution was used to perform a bilateral pudendal block. The genitals were washed with dynahex solution. The patient was then prepped with Chloroprep and drapped in a multilayer surgical fashion.     A 16 [place] citizen Yousif catheter was placed sterilely on the field and the balloon inflated with 10 cc of sterile water. A Daniel retractor was brought onto the field and the urethral hook was placed at the 12 o'clock position of the meatus and penis was placed on stretch. A 3 cm incision was made sharply with the scalpel approximately one fingerbreadth below the penoscrotal junction in a transverse fashion.     The incision was then brought down to the dartos fascia using Bovie electrocautery layer by layer until the corpora cavernosa were exposed.  2-0 Vicryl holding sutures were placed in the corpora bilaterally. 120cc of lidocaine/saline was injected into the corpora and corporotomies were made using Bovie electrocautery.  The corpora were easily dilated with the #11 and #12 Ortiz dilators.  Using the David,  the patient's corpora were measured to be 10 cm in the proximal direction and 13 cm in the distal direction bilaterally and we decided to place 23 cm cylinders with 1cm rear tips.      We then turned our attention to the space of Retzius.  This was bluntly dissected  on the left hand side by the surgeon.  The space was created through which we placed the 125cc  reservoir.  The reservoir was then filled with 125 mL of sterile saline. Copious amounts of antibiotic solution were used during this dissection and placement.    Once this was complete, the penile prosthesis was prepared off the field and brought onto the field. The penile prosthesis cylinders were placed first proximally and then distally using a Ruel needle on a David passer.  The holding sutures were then tied down to close the corporotomies. Copious amounts of antibiotic solution were used for irrigation during this part of the procedure.      A Idalia clamp and nasal speculum were used to develop a dartos pouch in which the penile prosthesis pump was placed. The pump was placed in the most dependent region of the scrotum and held down.  The tubing was then connected using connectors from the penile prosthesis kit.  Intervening tissue was closed using 2-0 Vicryl suture.      A MAKI drain was placed on the right hand side of the groin through a stab incision and placed dependently over the pump.  The dartos was then closed in a running fashion using a 2-0 Vicryl suture after copious amounts of antibiotic irrigation.  The skin was then reapproximated using 4-0 Monocryl sutures in a simple interrupted fashion. The wound was then cleaned and dried and scrotal support and scrotal fluffs were placed.  The Yousif catheter was secured to his leg.  The patient was then awoken from general anesthesia and seemed to tolerate the procedure quite well. He was transferred to the stretcher and brought back to the PACU in stable condition.    Complications:  None; patient tolerated the procedure well.    Disposition: PACU - hemodynamically stable.  Condition: stable         Additional Details: Patient to follow up for TOV/POC 10/27 with YEHUDA Billy    Attending Attestation: I was present and scrubbed for the entire  procedure.    Oc Patel  Phone Number: [phone]

## 2023-10-26 NOTE — INTERVAL H&P NOTE
H&P reviewed. The patient was examined and there are no changes to the H&P.    Pt seen and examined  Consent signed by pt's mom at patient's request as he has no vision.

## 2023-10-26 NOTE — ANESTHESIA PREPROCEDURE EVALUATION
Patient: Neno Chew    Procedure Information       Date/Time: 10/26/23 1230    Procedure: Insertion Inflatable Penile Prosthesis (Groin)    Location: [facility] OR 01 / [facility] OR    Surgeons: Oc Patel MD     Vitals:    10/26/23 1003   BP: 179/73   Pulse: 71   Resp: 16   Temp: 36.1 °C (97 °F)   SpO2: 98%       Past Surgical History:   Procedure Laterality Date    APPENDECTOMY      CATARACT EXTRACTION Bilateral     COLONOSCOPY      TOE AMPUTATION Left 2019    left great toe, skin grafting of 3 large wounds over dorsum of left foot    VASECTOMY  2003     Past Medical History:   Diagnosis Date    Adjustment disorder     Blindness     Carpal tunnel syndrome, bilateral     CKD (chronic kidney disease)     stage 3, 8/17/23: creatinine 1.7, GFR 48    Diabetes mellitus (CMS/[facility])     8/17/23 HgbA1C 6.3%    Diabetic neuropathy (CMS/[facility])     Hyperlipidemia     Hypertension     Insomnia        Current Facility-Administered Medications:     chlorhexidine (Hibiclens) 4 % liquid, , Topical, Daily PRN, cO Patel MD    fluconazole in NaCl (iso-osm) (Diflucan) IVPB 400 mg, 400 mg, intravenous, Once, Oc Patel MD, Last Rate: 200 mL/hr at 10/26/23 1024, 400 mg at 10/26/23 1024    gabapentin (Neurontin) capsule 600 mg, 600 mg, oral, Once, Oc Patel MD    sodium chloride 0.9% infusion, 100 mL/hr, intravenous, Continuous, Oc Patel MD, Last Rate: 100 mL/hr at 10/26/23 1023, 100 mL/hr at 10/26/23 1023    vancomycin (Vancocin) in dextrose 5 % water (D5W) 500 mL IV 1,500 mg, 1,500 mg, intravenous, Once, Oc Patel MD, Last Rate: 333.3 mL/hr at 10/26/23 1025, 1,500 mg at 10/26/23 1025  Prior to Admission medications    Medication Sig Start Date End Date Taking? Authorizing Provider   brimonidine (AlphaGAN P) 0.2 % ophthalmic solution INSTILL 1 DROP INTO INTO LEFT EYE TWICE A DAY 8/4/23  Yes Historical Provider, MD   chlorhexidine (Peridex) 0.12 % solution  "Swish for 30 seconds and spit 15mL of solution the night before and morning of surgery 10/19/23  Yes Yazmin Curiel PA-C   Combigan 0.2-0.5 % ophthalmic solution Administer 1 drop into both eyes 2 times a day. 1/5/23  Yes Historical Provider, MD   losartan (Cozaar) 50 mg tablet Take 1 tablet (50 mg) by mouth once daily. 6/24/23  Yes Historical Provider, MD   multivitamin tablet Take 1 tablet by mouth once daily.   Yes Historical Provider, MD   rosuvastatin (Crestor) 5 mg tablet Take 1 tablet (5 mg) by mouth once daily. 5/29/23  Yes Historical Provider, MD   TESTOSTERONE, BULK, MISC once daily.   Yes Historical Provider, MD   zolpidem (Ambien) 10 mg tablet Take 1 tablet (10 mg) by mouth as needed at bedtime. 8/10/23  Yes Historical Provider, MD     No Known Allergies  Social History     Tobacco Use    Smoking status: Never    Smokeless tobacco: Never   Substance Use Topics    Alcohol use: Never         Chemistry    Lab Results   Component Value Date/Time     10/19/2023 1152    K 4.0 10/19/2023 1152     10/19/2023 1152    CO2 28 10/19/2023 1152    BUN 30 (H) 10/19/2023 1152    CREATININE 1.34 (H) 10/19/2023 1152    Lab Results   Component Value Date/Time    CALCIUM 9.3 10/19/2023 1152          Lab Results   Component Value Date/Time    WBC 5.8 10/19/2023 1152    HGB 13.5 10/19/2023 1152    HCT 39.4 (L) 10/19/2023 1152     10/19/2023 1152     No results found for: \"PROTIME\", \"PTT\", \"INR\"  Encounter Date: 10/19/23   ECG 12 lead (Clinic Performed)   Result Value    Ventricular Rate 76    Atrial Rate 76    NH Interval 146    QRS Duration 98    QT Interval 386    QTC Calculation(Bazett) 434    P Axis 62    R Axis 39    T Axis 21    QRS Count 12    Q Onset 210    P Onset 137    P Offset 191    T Offset 403    QTC Fredericia 417    Narrative    Normal sinus rhythm  Normal ECG  No previous ECGs available  Confirmed by Jerardo Dumont ([id number]) on 10/20/2023 8:31:27 AM     No results found for this or any " previous visit from the past 1095 days.         Relevant Problems   Anesthesia (within normal limits)      Cardiovascular   (+) HTN (hypertension) (Controlled)      Endocrine   (+) Diabetes mellitus, type 2 (CMS/HCC) (No medicines. States well-controlled with exercise)       Clinical information reviewed:   Tobacco  Allergies  Meds   Med Hx  Surg Hx   Fam Hx  Soc Hx        NPO Detail:  NPO/Void Status  Date of Last Liquid: 10/26/23  Time of Last Liquid: 0730  Date of Last Solid: 10/25/23  Time of Last Solid: 1800  Last Intake Type: Clear fluids  Time of Last Void: 0900         Physical Exam    Airway  Mallampati: III  TM distance: <3 FB  Neck ROM: full     Cardiovascular    Dental        Pulmonary    Abdominal      Other findings: Multiple cracks in teeth  Beard  Small mouth opening          Anesthesia Plan    ASA 3     general     The patient is not a current smoker.    intravenous induction   Anesthetic plan and risks discussed with patient and spouse.    Plan discussed with CRNA.

## 2023-10-26 NOTE — ANESTHESIA POSTPROCEDURE EVALUATION
Patient: Neno Chew    Procedure Summary       Date: 10/26/23 Room / Location: [facility] A OR 01 / [facility] OR    Anesthesia Start: 1341 Anesthesia Stop: 1603    Procedure: Insertion Inflatable Penile Prosthesis (Groin) Diagnosis:       Male erectile dysfunction, unspecified      (Male erectile dysfunction, unspecified [N52.9])    Surgeons: Oc Patel MD Responsible Provider: Collin Bose MD    Anesthesia Type: general ASA Status: 3            Anesthesia Type: general    Vitals Value Taken Time   /79 10/26/23 1717   Temp 36.2 °C (97.2 °F) 10/26/23 1645   Pulse 69 10/26/23 1728   Resp 12 10/26/23 1715   SpO2 96 % 10/26/23 1728   Vitals shown include unvalidated device data.    Anesthesia Post Evaluation    There were no known notable events for this encounter.

## 2023-10-26 NOTE — DISCHARGE INSTRUCTIONS
DEPARTMENT OF Urology  DISCHARGE INSTRUCTIONS PENILE PROSTHESIS  Outpatient Surgery    C O N F I D E N T I A L   I N F O R M A T I O N    Neno Chew    Call Urology Department [phone] during regular daytime business hours (8:00 am - 5:00 pm) and after 5:00 pm ask for the Urology resident with any questions or concerns.      If it is a life-threatening situation, proceed to the nearest emergency department.        Follow-up appointment: 10/27/2023 YEHUDA Billy, 11/15/23 Dr Patel    Thank you for the opportunity to care for you today.  Your health and healing are very important to us.  We hope we made you feel as comfortable as possible and are committed to your recovery and continued well-being.      The following is a brief overview of your penile prosthesis procedure. Some of the information contained on this summary may be confidential.  This information should be kept in your records and should be shared with your regular doctor.    Physicians:   Dr. Patel     Procedure performed: insertion of penile prosthesis    What to Expect During your Recovery and Home Care  Anesthesia Side Effects   You received anesthesia today.  You may feel sleepy, tired, or have a sore throat.   You may also feel drowsiness, dizziness, or inability to think clearly.  For your safety, do not drive, drink alcoholic beverages, take any unprescribed medication or make any important decisions for 24 hours.  A responsible adult should be with you for 24 hours.       Activity and Recovery    Go home and rest. No strenuous activity and no heavy lifting over 10 lbs.     Pain Control  Unfortunately, you may experience pain after your procedure.  Adequate management can include alternative measures to help ease your pain and can include ice packs, scrotal support, and over the counter Tylenol. oxycodone may also be prescribe for breakthrough pain. Do not take more than 4,000 mg of Tylenol in a 24-hour period.      oxycodone is a narcotic medication, which can impair judgment, slow reaction times, and cause constipation. Take Miralax, Colace, or other stool softeners for constipation. Do not drive or operate machinery while taking narcotic medications.    Nausea/Vomiting   Clear liquids are best tolerated at first. Start slow, advance your diet as tolerated to normal foods. Avoid spicy, greasy, heavy foods at first. Also, you may feel nauseous or like you need to vomit if you take any type of medication on an empty stomach.  Call your physician if you are unable to eat or drink and have persistent vomiting.          Signs of Bleeding   Minor bleeding or drainage may occur from the surgical site; however, excessive or consistent bleeding should be reported to your surgeon. Excessive bleeding is defined as blood that is dripping from wound, soaking you bandages, and is ketchup colored, thick with possible blood clots.  Consistent is defined as bleeding that does not stop.    Treatment/wound care:   Keep area(s) clean and dry. You can wear gauze dressing on top of the incisions, so the scrotal support does not irritate the incisions. It is incredibly important that you keep the scrotum well supported for the first two weeks after the surgery. Continue to wear the scrotal support/jock straps/tight fitting underwear.  If you have a drain in place, we will schedule an appointment for the drain to be removed.  The device is purposefully left partially inflated (approximately 30 to 40 percent). This will be deflated at the two week visit.  Pull the scrotal pump straight down towards the ground in the shower when the scrotal skin is supple. You should also do this every time you go to the bathroom This will allow the pump to heal in a nice dependent position.  It is okay to shower 48 hours after time of surgery.  Do not submerge incisions in water (tub bath, swimming) until incisions have completely healed.  Do not scrub wound(s),  pat dry.  Clean with mild soap. Do not use oils or lotions on incisions.    Please visually inspect your wound(s) at least once daily.  If the wound(s) are in a difficult to see location, please use a mirror or have someone else assist with visual inspection.    Signs of Infection  Signs of infection can include fever, excessive swelling, heat, drainage, redness, or severe pain.  If you see any of these occur, please contact [phone]. Any fever higher than 100.4, especially if associated with an ill feeling, abdominal pain, chills, or nausea should be reported to your surgeon.        Additional Instructions:   No sex until discussed further at your follow up appointment. Your device has been left partially inflated, do not deflate, or inflate further. You should wear tight fitting underwear for the next six weeks. At your six week follow up you will be taught how to use your device.

## 2023-10-26 NOTE — ANESTHESIA PROCEDURE NOTES
Airway  Date/Time: 10/26/2023 1:57 PM  Urgency: elective    Airway not difficult    Staffing  Performed: CRNA   Authorized by: Collin Bose MD    Performed by: YEHUDA Ruiz-CRNA  Patient location during procedure: OR    Indications and Patient Condition  Indications for airway management: anesthesia  Spontaneous ventilation: present  Sedation level: deep  Preoxygenated: yes  Patient position: sniffing  Mask difficulty assessment: 1 - vent by mask    Final Airway Details  Final airway type: supraglottic airway      Successful airway: Size 4     Number of attempts at approach: 1  Number of other approaches attempted: 0

## 2023-10-26 NOTE — ANESTHESIA POSTPROCEDURE EVALUATION
Patient: Neno Chew    Procedure Summary       Date: 10/26/23 Room / Location: [facility] A OR 01 / Virtual [facility] OR    Anesthesia Start: 1341 Anesthesia Stop: 1603    Procedure: Insertion Inflatable Penile Prosthesis (Groin) Diagnosis:       Male erectile dysfunction, unspecified      (Male erectile dysfunction, unspecified [N52.9])    Surgeons: Oc Patel MD Responsible Provider: Collin Bose MD    Anesthesia Type: general ASA Status: 3            Anesthesia Type: general    Vitals Value Taken Time   /95 10/26/23 1602   Temp *** 10/26/23 1604   Pulse 72 10/26/23 1604   Resp *** 10/26/23 1604   SpO2 97 % 10/26/23 1604   Vitals shown include unvalidated device data.    Anesthesia Post Evaluation    There were no known notable events for this encounter.

## 2023-10-26 NOTE — PERIOPERATIVE NURSING NOTE
Pt discharge info questioned by family, script sent to mail order company. Dr notified, all issues resolved. No questions re dc instructions.Neno Chew taken to lobby for dc to family. Awake, alert.

## 2023-10-27 ENCOUNTER — OFFICE VISIT (OUTPATIENT)
Dept: UROLOGY | Facility: HOSPITAL | Age: 53
End: 2023-10-27
Payer: MEDICARE

## 2023-10-27 VITALS
HEART RATE: 82 BPM | TEMPERATURE: 97.2 F | BODY MASS INDEX: 29.35 KG/M2 | HEIGHT: 70 IN | DIASTOLIC BLOOD PRESSURE: 69 MMHG | WEIGHT: 205 LBS | SYSTOLIC BLOOD PRESSURE: 145 MMHG

## 2023-10-27 DIAGNOSIS — N52.9 ERECTILE DYSFUNCTION, UNSPECIFIED ERECTILE DYSFUNCTION TYPE: Primary | ICD-10-CM

## 2023-10-27 PROCEDURE — 3051F HG A1C>EQUAL 7.0%<8.0%: CPT | Performed by: NURSE PRACTITIONER

## 2023-10-27 PROCEDURE — 3077F SYST BP >= 140 MM HG: CPT | Performed by: NURSE PRACTITIONER

## 2023-10-27 PROCEDURE — 3078F DIAST BP <80 MM HG: CPT | Performed by: NURSE PRACTITIONER

## 2023-10-27 PROCEDURE — 99024 POSTOP FOLLOW-UP VISIT: CPT | Performed by: NURSE PRACTITIONER

## 2023-10-27 PROCEDURE — 1036F TOBACCO NON-USER: CPT | Performed by: NURSE PRACTITIONER

## 2023-10-27 PROCEDURE — 4010F ACE/ARB THERAPY RXD/TAKEN: CPT | Performed by: NURSE PRACTITIONER

## 2023-10-27 RX ORDER — GLIMEPIRIDE 4 MG/1
4 TABLET ORAL
COMMUNITY

## 2023-10-27 RX ORDER — BROMFENAC 1.03 MG/ML
1 SOLUTION/ DROPS OPHTHALMIC
COMMUNITY

## 2023-10-27 RX ORDER — PREDNISOLONE SODIUM PHOSPHATE 10 MG/ML
1 SOLUTION/ DROPS OPHTHALMIC DAILY
COMMUNITY

## 2023-10-27 RX ORDER — DORZOLAMIDE HCL 20 MG/ML
SOLUTION/ DROPS OPHTHALMIC
COMMUNITY
Start: 2023-10-23

## 2023-10-27 RX ORDER — EMPAGLIFLOZIN 25 MG/1
TABLET, FILM COATED ORAL
COMMUNITY
Start: 2023-10-12

## 2023-10-27 ASSESSMENT — PAIN SCALES - GENERAL: PAINLEVEL: 0-NO PAIN

## 2023-10-27 NOTE — PROGRESS NOTES
[facility]  Outpatient Clinic Note      Patient: Neno Chew  Age/Sex: 53 y.o., male  MRN: [id number]      History of Present Illness  53-year-old gentleman with history of ED now s/p IPP with Dr. Oc Patel on 10/26/2023. He reports he has been doing well since surgery. He is eating and drinking, as normal. Urine has remained clear, yellow. No drainage from incision site. He is ambulating at baseline. No fevers or chills.      Past Medical & Surgical History  Past Medical History:   Diagnosis Date    Adjustment disorder     Blindness     Carpal tunnel syndrome, bilateral     CKD (chronic kidney disease)     stage 3, 8/17/23: creatinine 1.7, GFR 48    Diabetes mellitus (CMS/[facility])     8/17/23 HgbA1C 6.3%    Diabetic neuropathy (CMS/[facility])     Hyperlipidemia     Hypertension     Insomnia       Past Surgical History:   Procedure Laterality Date    APPENDECTOMY      CATARACT EXTRACTION Bilateral     COLONOSCOPY      TOE AMPUTATION Left 2019    left great toe, skin grafting of 3 large wounds over dorsum of left foot    VASECTOMY  2003          Labs  NA    Medications:  Current Outpatient Medications on File Prior to Visit   Medication Sig Dispense Refill    acetaminophen (Tylenol) 325 mg tablet Take 2 tablets (650 mg) by mouth every 6 hours if needed for mild pain (1 - 3). 56 tablet 0    brimonidine (AlphaGAN P) 0.2 % ophthalmic solution INSTILL 1 DROP INTO INTO LEFT EYE TWICE A DAY      chlorhexidine (Peridex) 0.12 % solution Swish for 30 seconds and spit 15mL of solution the night before and morning of surgery 475 mL 0    Combigan 0.2-0.5 % ophthalmic solution Administer 1 drop into both eyes 2 times a day.      ibuprofen 600 mg tablet Take 1 tablet (600 mg) by mouth every 6 hours if needed for mild pain (1 - 3). 28 tablet 0    losartan (Cozaar) 50 mg tablet Take 1 tablet (50 mg) by mouth once daily.      multivitamin tablet Take 1 tablet by mouth once daily.      oxyCODONE (Roxicodone) 5 mg  immediate release tablet Take 1 tablet (5 mg) by mouth every 6 hours if needed for severe pain (7 - 10). 28 tablet 0    polyethylene glycol (Glycolax, Miralax) 17 gram packet Take 17 g by mouth once daily for 10 days. 10 packet 0    rosuvastatin (Crestor) 5 mg tablet Take 1 tablet (5 mg) by mouth once daily.      sulfamethoxazole-trimethoprim (Bactrim DS) 800-160 mg tablet Take 1 tablet by mouth 2 times a day for 7 days. 14 tablet 0    TESTOSTERONE, BULK, MISC once daily.      zolpidem (Ambien) 10 mg tablet Take 1 tablet (10 mg) by mouth as needed at bedtime.      [DISCONTINUED] acetaminophen (Tylenol) 325 mg tablet Take 2 tablets (650 mg) by mouth every 6 hours if needed for mild pain (1 - 3) for up to 7 days. 56 tablet 0    [DISCONTINUED] ibuprofen 600 mg tablet Take 1 tablet (600 mg) by mouth every 6 hours if needed for mild pain (1 - 3) for up to 7 days. 28 tablet 0    [DISCONTINUED] oxyCODONE (Roxicodone) 5 mg immediate release tablet Take 1 tablet (5 mg) by mouth every 6 hours if needed for severe pain (7 - 10) for up to 7 days. 28 tablet 0    [DISCONTINUED] polyethylene glycol (Glycolax, Miralax) 17 gram packet Take 17 g by mouth once daily for 10 days. 10 packet 0    [DISCONTINUED] sulfamethoxazole-trimethoprim (Bactrim DS) 800-160 mg tablet Take 1 tablet by mouth 2 times a day for 7 days. 14 tablet 0     Current Facility-Administered Medications on File Prior to Visit   Medication Dose Route Frequency Provider Last Rate Last Admin    [COMPLETED] fluconazole in NaCl (iso-osm) (Diflucan) IVPB 400 mg  400 mg intravenous Once Oc Patel  mL/hr at 10/26/23 1024 400 mg at 10/26/23 1024    [COMPLETED] lidocaine (Uro-Jet) 2 % gel 1 Application  5 mL Topical Once Collin Bose MD   1 Application at 10/26/23 1720    [COMPLETED] vancomycin (Vancocin) in dextrose 5 % water (D5W) 500 mL IV 1,500 mg  1,500 mg intravenous Once Oc Patel .3 mL/hr at 10/26/23 1025 1,500 mg at 10/26/23  1025    [DISCONTINUED] albuterol 2.5 mg /3 mL (0.083 %) nebulizer solution 2.5 mg  2.5 mg nebulization Once PRN Collin Nolen MD        [DISCONTINUED] bupivacaine PF (Marcaine) 0.25 % (2.5 mg/mL) injection    PRN Oc Patel MD   29 mL at 10/26/23 1432    [DISCONTINUED] chlorhexidine (Hibiclens) 4 % liquid   Topical Daily PRN Oc Patel MD        [DISCONTINUED] dexAMETHasone (Decadron) injection    PRN Oc Patel MD   4 mg at 10/26/23 1434    [DISCONTINUED] diphenhydrAMINE (BENADryl) injection 12.5 mg  12.5 mg intravenous Once PRN Collin Nolen MD        [DISCONTINUED] ePHEDrine injection   intravenous PRN Jojo Ludwig APRN-CRNA   10 mg at 10/26/23 1445    [DISCONTINUED] fentaNYL PF (Sublimaze) injection   intravenous PRN Jojo Ludwig APRN-CRNA   25 mcg at 10/26/23 1410    [DISCONTINUED] gabapentin (Neurontin) capsule 600 mg  600 mg oral Once Oc Patel MD        [DISCONTINUED] hydrALAZINE (Apresoline) injection 5 mg  5 mg intravenous q30 min PRN Collin Nolen MD        [DISCONTINUED] HYDROmorphone (Dilaudid) injection 0.2 mg  0.2 mg intravenous q5 min PRN Collin Nolen MD        [DISCONTINUED] HYDROmorphone (Dilaudid) injection 0.5 mg  0.5 mg intravenous q5 min PRN Collin Nolen MD   0.5 mg at 10/26/23 1620    [DISCONTINUED] lactated Ringer's infusion  100 mL/hr intravenous Continuous Collin Nolen MD        [DISCONTINUED] lidocaine (Xylocaine) 10 mg/mL (1 %) injection    PRN Oc Patel MD   20 mL at 10/26/23 1435    [DISCONTINUED] midazolam (Versed) injection   intravenous PRN Jojo Ludwig APRN-CRNA   2 mg at 10/26/23 1349    [DISCONTINUED] ondansetron (Zofran) injection 4 mg  4 mg intravenous Once PRN Collin Nolen MD        [DISCONTINUED] ondansetron (Zofran) injection   intravenous PRN MATTHEW Ruiz   4 mg at 10/26/23 1518    [DISCONTINUED] oxyCODONE (Roxicodone) immediate release tablet 5 mg  5 mg oral q4h PRN  Collin Nolen MD        [DISCONTINUED] phenylephrine in NS (Jim-Synephrine) 100 mcg/mL syringe   intravenous PRN Jojogaetano Ludwig APRN-CRNA   100 mcg at 10/26/23 1445    [DISCONTINUED] promethazine (Phenergan) 6.25 mg in sodium chloride 0.9% 50 mL IV  6.25 mg intravenous Once PRN Collin Nolen MD        [DISCONTINUED] propofol (Diprivan) injection   intravenous PRN Jojo JASPER Ludwig APRN-CRNA   25 mg at 10/26/23 1423    [DISCONTINUED] sodium chloride 0.9 % bolus   intravenous Continuous PRN Jojo JASPER Ludwig APRN-CRNA   Stopped at 10/26/23 1600    [DISCONTINUED] sodium chloride 0.9% infusion  100 mL/hr intravenous Continuous Oc Patel MD   Stopped at 10/26/23 1527          Physical Exam                                                                                                                      General: Well developed, well nourished, alert and cooperative, appears in no acute distress  Eyes: Non-injected conjunctiva, sclera clear, no proptosis  Cardiac: Extremities are warm and well perfused. No edema, cyanosis or pallor.   Lungs: Breathing is easy, non-labored. Speaking in clear and complete sentences. Normal diaphragmatic movement.  MSK: Ambulatory with steady gait, unassisted  Neuro: alert and oriented to person, place and time  Psych: Demonstrates good judgement and reason, without hallucinations, abnormal affect or abnormal behaviors.  Skin: no obvious lesions, no rashes      Review of Systems  Review of Systems   All other systems reviewed and are negative.         Imaging  NA      Assessment & Plan  53-year-old gentleman with history of ED now s/p IPP with Dr. Oc Patel on 10/26/2023. He reports he has been doing well since surgery. He is eating and drinking, as normal. Urine has remained clear, yellow. No drainage from incision site. He is ambulating at baseline. No fevers or chills.    Drain removed and patient tolerated well. He passed TOV. He was educated on  postoperative incision care. He was educated on activity restrictions. He was educated on pump anatomy and encouraged to gently pull down the pump within the scrotum. He will not inflate IPP or have intercourse for 6 weeks. He will follow-up with Dr. Oc Patel  at previously scheduled appointment. He will call the office with any new or concerning symptoms.    You are able to have email access to your chart. You can sign into [employer] liana on your smart phone to review today's visit, laboratory work and imaging.     If you have any questions about your care, do not hesitate to call and leave a message, we return calls in a timely manner.    Luanne BLACKMAN CNP  Office Phone: [phone]        Reviewed and approved by JESSI MAJANO on 10/27/23 at 11:09 AM.

## 2023-10-30 ENCOUNTER — APPOINTMENT (OUTPATIENT)
Dept: UROLOGY | Facility: CLINIC | Age: 53
End: 2023-10-30
Payer: COMMERCIAL

## 2023-11-15 ENCOUNTER — OFFICE VISIT (OUTPATIENT)
Dept: UROLOGY | Facility: CLINIC | Age: 53
End: 2023-11-15
Payer: MEDICARE

## 2023-11-15 ENCOUNTER — APPOINTMENT (OUTPATIENT)
Dept: UROLOGY | Facility: CLINIC | Age: 53
End: 2023-11-15
Payer: COMMERCIAL

## 2023-11-15 VITALS — HEIGHT: 70 IN | BODY MASS INDEX: 30.06 KG/M2 | TEMPERATURE: 97.6 F | WEIGHT: 210 LBS

## 2023-11-15 DIAGNOSIS — N52.03 COMBINED ARTERIAL INSUFFICIENCY AND CORPORO-VENOUS OCCLUSIVE ERECTILE DYSFUNCTION: ICD-10-CM

## 2023-11-15 DIAGNOSIS — Z96.0 S/P INSERTION OF PENILE IMPLANT: ICD-10-CM

## 2023-11-15 DIAGNOSIS — E29.1 HYPOGONADISM IN MALE: Primary | ICD-10-CM

## 2023-11-15 PROCEDURE — 99024 POSTOP FOLLOW-UP VISIT: CPT | Performed by: UROLOGY

## 2023-11-15 PROCEDURE — 1036F TOBACCO NON-USER: CPT | Performed by: UROLOGY

## 2023-11-15 PROCEDURE — 3051F HG A1C>EQUAL 7.0%<8.0%: CPT | Performed by: UROLOGY

## 2023-11-15 PROCEDURE — 4010F ACE/ARB THERAPY RXD/TAKEN: CPT | Performed by: UROLOGY

## 2023-11-15 ASSESSMENT — PAIN SCALES - GENERAL: PAINLEVEL: 0-NO PAIN

## 2023-11-15 NOTE — PROGRESS NOTES
HPI  51 yo M referred by self Dr. Cummins for erectile dysfunction. Has had 6 eye surgeries, lost vision 4 years ago to diabetes. No longer diabetic.      Last visit 8/30/23:  -will proceed with inflatable penile prosthesis  - traditional reservoir placement, will consider infrapubic placement  -vancomycin, Zosyn and fluconazole for surgery  -will leave roldan in    Todays Visit:  Luanne's notes reviewed.    #Erectile Dysfunction   -s/p Coloplast Titan 22cm with 1cm RTE and 125cc reservoir 10/26/23  - Doing well post implant, has not tried inflating or deflating it.  - No nausea, vomiting, fever or chills  - Does note swelling, no pus on the wound  -No blood in his urine       HISTORY:   -Previous gynecomastia: none   -Personal or family history of prostate cancer: none   -Previous use of testosterone or anabolic steroids: none     Lab Results   Component Value Date    CREATININE 1.34 (H) 10/19/2023     Lab Results   Component Value Date    HGBA1C 7.1 (H) 10/19/2023     Lab Results   Component Value Date    HCT 39.4 (L) 10/19/2023       Current Medications:  Current Outpatient Medications   Medication Sig Dispense Refill    acetaminophen (Tylenol) 325 mg tablet Take 2 tablets (650 mg) by mouth every 6 hours if needed for mild pain (1 - 3). 56 tablet 0    brimonidine (AlphaGAN P) 0.2 % ophthalmic solution INSTILL 1 DROP INTO INTO LEFT EYE TWICE A DAY      bromfenac (Xibrom) 0.09 % ophthalmic solution 1 drop.      chlorhexidine (Peridex) 0.12 % solution Swish for 30 seconds and spit 15mL of solution the night before and morning of surgery 475 mL 0    Combigan 0.2-0.5 % ophthalmic solution Administer 1 drop into both eyes 2 times a day.      dorzolamide (Trusopt) 2 % ophthalmic solution INSTILL 1 DROP INTO LEFT EYE TWICE A DAY      glimepiride (Amaryl) 4 mg tablet Take 1 tablet (4 mg) by mouth.      ibuprofen 600 mg tablet Take 1 tablet (600 mg) by mouth every 6 hours if needed for mild pain (1 - 3). 28 tablet 0     Jardiance 25 mg       losartan (Cozaar) 50 mg tablet Take 1 tablet (50 mg) by mouth once daily.      multivitamin tablet Take 1 tablet by mouth once daily.      oxyCODONE (Roxicodone) 5 mg immediate release tablet Take 1 tablet (5 mg) by mouth every 6 hours if needed for severe pain (7 - 10). 28 tablet 0    prednisoLONE sodium phosphate (Inflamase Forte) 1 % ophthalmic solution Administer 1 drop into the left eye once daily.      rosuvastatin (Crestor) 5 mg tablet Take 1 tablet (5 mg) by mouth once daily.      TESTOSTERONE, BULK, MISC once daily.      zolpidem (Ambien) 10 mg tablet Take 1 tablet (10 mg) by mouth as needed at bedtime.       No current facility-administered medications for this visit.        PMH:  Past Medical History:   Diagnosis Date    Adjustment disorder     Blindness     Carpal tunnel syndrome, bilateral     CKD (chronic kidney disease)     stage 3, 8/17/23: creatinine 1.7, GFR 48    Diabetes mellitus (CMS/[facility])     8/17/23 HgbA1C 6.3%    Diabetic neuropathy (CMS/[facility])     Hyperlipidemia     Hypertension     Insomnia        PSH:  Past Surgical History:   Procedure Laterality Date    APPENDECTOMY      CATARACT EXTRACTION Bilateral     COLONOSCOPY      TOE AMPUTATION Left 2019    left great toe, skin grafting of 3 large wounds over dorsum of left foot    VASECTOMY  2003       FMH:  No family history on file.    SHx:  Social History     Tobacco Use    Smoking status: Never    Smokeless tobacco: Never   Vaping Use    Vaping Use: Never used   Substance Use Topics    Alcohol use: Never    Drug use: Never       Allergies:  No Known Allergies    Physical Exam   Implant: tips properly placed, inflates and deflates properly   Pump in place, nonfixed, able to inflate and deflate   No signs of infection erosion or extrusion   Post op scrotal swelling present, right tip slightly more proximal    Assessment/Plan  #Severe Erectile Dysfunction -    (s/p Coloplast Titan 22cm with 1cm RTE and 125cc reservoir  10/26/23)  -Pumping instructions reviewed.  -Continue icing.  -warning signs reviewed    Follow up in 3 weeks.    Scribe Attestation    Scribe Attestation  By signing my name below, IYadira Scribe attest that this documentation has been prepared under the direction and in the presence of Oc Patel MD. All medical record entries made by the Scribe were at my direction or personally dictated by me. I have reviewed the chart and agree that the record accurately reflects my personal performance of the history, physical exam, discussion and plan.    By signing my name below, IHali Scribe   attest that this documentation has been prepared under the direction and in the presence of Oc Patel MD.

## 2023-12-06 ENCOUNTER — OFFICE VISIT (OUTPATIENT)
Dept: UROLOGY | Facility: CLINIC | Age: 53
End: 2023-12-06
Payer: MEDICARE

## 2023-12-06 DIAGNOSIS — N52.8 OTHER MALE ERECTILE DYSFUNCTION: Primary | ICD-10-CM

## 2023-12-06 PROCEDURE — 99024 POSTOP FOLLOW-UP VISIT: CPT | Performed by: UROLOGY

## 2023-12-06 PROCEDURE — 1036F TOBACCO NON-USER: CPT | Performed by: UROLOGY

## 2023-12-06 PROCEDURE — 3051F HG A1C>EQUAL 7.0%<8.0%: CPT | Performed by: UROLOGY

## 2023-12-06 PROCEDURE — 4010F ACE/ARB THERAPY RXD/TAKEN: CPT | Performed by: UROLOGY

## 2023-12-06 NOTE — PROGRESS NOTES
"53 yo M referred by self Dr. Cummins for erectile dysfunction. Has had 6 eye surgeries, lost vision 4 years ago to diabetes. No longer diabetic.     Last visit:11/15/2023  #Severe Erectile Dysfunction -    (s/p Coloplast Titan 22cm with 1cm RTE and 125cc reservoir 10/26/23)  -Pumping instructions reviewed.  -Continue icing.  -warning signs reviewed    Today's visit:  #6-weeks Post op  -Soreness at the tip  - Can pump and deflate, girlfriend has been assisting  -No malodorous smell, discharge  -Admits to performing exercises      Labs  No results found for: \"TESTOSTERONE\"  No results found for: \"LH\"  No results found for: \"FSH\"  No components found for: \"ESTRADIAL\"  No results found for: \"PSA\"  No components found for: \"CBC\"  No results found for: \"PROLACTIN\"  Lab Results   Component Value Date    HGBA1C 7.1 (H) 10/19/2023     No components found for: \"HEMATOCRIT\"      Medications:    Current Outpatient Medications:     acetaminophen (Tylenol) 325 mg tablet, Take 2 tablets (650 mg) by mouth every 6 hours if needed for mild pain (1 - 3)., Disp: 56 tablet, Rfl: 0    brimonidine (AlphaGAN P) 0.2 % ophthalmic solution, INSTILL 1 DROP INTO INTO LEFT EYE TWICE A DAY, Disp: , Rfl:     bromfenac (Xibrom) 0.09 % ophthalmic solution, 1 drop., Disp: , Rfl:     chlorhexidine (Peridex) 0.12 % solution, Swish for 30 seconds and spit 15mL of solution the night before and morning of surgery, Disp: 475 mL, Rfl: 0    Combigan 0.2-0.5 % ophthalmic solution, Administer 1 drop into both eyes 2 times a day., Disp: , Rfl:     dorzolamide (Trusopt) 2 % ophthalmic solution, INSTILL 1 DROP INTO LEFT EYE TWICE A DAY, Disp: , Rfl:     glimepiride (Amaryl) 4 mg tablet, Take 1 tablet (4 mg) by mouth., Disp: , Rfl:     ibuprofen 600 mg tablet, Take 1 tablet (600 mg) by mouth every 6 hours if needed for mild pain (1 - 3)., Disp: 28 tablet, Rfl: 0    Jardiance 25 mg, , Disp: , Rfl:     losartan (Cozaar) 50 mg tablet, Take 1 tablet (50 mg) by mouth " once daily., Disp: , Rfl:     multivitamin tablet, Take 1 tablet by mouth once daily., Disp: , Rfl:     oxyCODONE (Roxicodone) 5 mg immediate release tablet, Take 1 tablet (5 mg) by mouth every 6 hours if needed for severe pain (7 - 10)., Disp: 28 tablet, Rfl: 0    prednisoLONE sodium phosphate (Inflamase Forte) 1 % ophthalmic solution, Administer 1 drop into the left eye once daily., Disp: , Rfl:     rosuvastatin (Crestor) 5 mg tablet, Take 1 tablet (5 mg) by mouth once daily., Disp: , Rfl:     TESTOSTERONE, BULK, MISC, once daily., Disp: , Rfl:     zolpidem (Ambien) 10 mg tablet, Take 1 tablet (10 mg) by mouth as needed at bedtime., Disp: , Rfl:     Allergy:  No Known Allergies     Exam    Implant: tips properly placed, inflates and deflates properly   Pump in place, nonfixed, able to inflate and deflate   No signs of infection erosion or extrusion   Right tip more proximal      Assessment/Plan  #Erectile dysfunction- 6 weeks Post after inflatable penile prosthesis  - Wait 2 weeks before resuming sexual activity  -warning signs reviewed  -Follow up in 3 months    Scribe Attestation  By signing my name below, IBessie, Scrselene   attest that this documentation has been prepared under the direction and in the presence of Oc Patel MD.

## 2024-03-06 ENCOUNTER — OFFICE VISIT (OUTPATIENT)
Dept: UROLOGY | Facility: CLINIC | Age: 54
End: 2024-03-06
Payer: MEDICARE

## 2024-03-06 VITALS — BODY MASS INDEX: 30.13 KG/M2 | TEMPERATURE: 97.8 F | HEIGHT: 70 IN

## 2024-03-06 DIAGNOSIS — Z96.0 S/P INSERTION OF PENILE IMPLANT: Primary | ICD-10-CM

## 2024-03-06 DIAGNOSIS — N52.8 OTHER MALE ERECTILE DYSFUNCTION: ICD-10-CM

## 2024-03-06 DIAGNOSIS — Z09 ENCOUNTER FOR FOLLOW-UP: ICD-10-CM

## 2024-03-06 PROCEDURE — 1036F TOBACCO NON-USER: CPT | Performed by: UROLOGY

## 2024-03-06 PROCEDURE — 4010F ACE/ARB THERAPY RXD/TAKEN: CPT | Performed by: UROLOGY

## 2024-03-06 PROCEDURE — 99212 OFFICE O/P EST SF 10 MIN: CPT | Performed by: UROLOGY

## 2024-03-06 ASSESSMENT — PAIN SCALES - GENERAL: PAINLEVEL: 0-NO PAIN

## 2024-03-06 NOTE — PROGRESS NOTES
54 yo M referred by self Dr. Cummins for erectile dysfunction. Has had 6 eye surgeries, lost vision 4 years ago to diabetes. No longer diabetic.     Last visit:12/6/2023  #Severe Erectile Dysfunction -    (s/p Coloplast Titan 22cm with 1cm RTE and 125cc reservoir 10/26/23)  - Wait 2 weeks before resuming sexual activity  -warning signs reviewed  -Follow up in 3 months    Today's visit:  #Erectile Dysfunction  (s/p Coloplast Titan 22cm with 1cm RTE and 125cc reservoir 10/26/23)  -well healed  -working well  -no pain; blind so can't see blood in urine   -having sex, partner is happy      Medications:    Current Outpatient Medications:     acetaminophen (Tylenol) 325 mg tablet, Take 2 tablets (650 mg) by mouth every 6 hours if needed for mild pain (1 - 3)., Disp: 56 tablet, Rfl: 0    brimonidine (AlphaGAN P) 0.2 % ophthalmic solution, INSTILL 1 DROP INTO INTO LEFT EYE TWICE A DAY, Disp: , Rfl:     bromfenac (Xibrom) 0.09 % ophthalmic solution, 1 drop., Disp: , Rfl:     chlorhexidine (Peridex) 0.12 % solution, Swish for 30 seconds and spit 15mL of solution the night before and morning of surgery, Disp: 475 mL, Rfl: 0    Combigan 0.2-0.5 % ophthalmic solution, Administer 1 drop into both eyes 2 times a day., Disp: , Rfl:     dorzolamide (Trusopt) 2 % ophthalmic solution, INSTILL 1 DROP INTO LEFT EYE TWICE A DAY, Disp: , Rfl:     glimepiride (Amaryl) 4 mg tablet, Take 1 tablet (4 mg) by mouth., Disp: , Rfl:     ibuprofen 600 mg tablet, Take 1 tablet (600 mg) by mouth every 6 hours if needed for mild pain (1 - 3)., Disp: 28 tablet, Rfl: 0    Jardiance 25 mg, , Disp: , Rfl:     losartan (Cozaar) 50 mg tablet, Take 1 tablet (50 mg) by mouth once daily., Disp: , Rfl:     multivitamin tablet, Take 1 tablet by mouth once daily., Disp: , Rfl:     oxyCODONE (Roxicodone) 5 mg immediate release tablet, Take 1 tablet (5 mg) by mouth every 6 hours if needed for severe pain (7 - 10)., Disp: 28 tablet, Rfl: 0    prednisoLONE sodium  phosphate (Inflamase Forte) 1 % ophthalmic solution, Administer 1 drop into the left eye once daily., Disp: , Rfl:     rosuvastatin (Crestor) 5 mg tablet, Take 1 tablet (5 mg) by mouth once daily., Disp: , Rfl:     TESTOSTERONE, BULK, MISC, once daily., Disp: , Rfl:     zolpidem (Ambien) 10 mg tablet, Take 1 tablet (10 mg) by mouth as needed at bedtime., Disp: , Rfl:     Allergy:  No Known Allergies     Exam  Implant: tips properly placed, inflates and deflates properly   Pump in place, nonfixed, able to inflate and deflate   No signs of infection erosion or extrusion     Assessment/Plan  #Erectile dysfunction- 6 weeks Post after inflatable penile prosthesis  -warning signs reviewed  -fu prn      Scribe Attestation  By signing my name below, Hali DE LEON, Scribe, attest that this documentation  has been prepared under the direction and in the presence of Oc Patel MD.

## 2024-05-06 VITALS
HEART RATE: 80 BPM | SYSTOLIC BLOOD PRESSURE: 187 MMHG | BODY MASS INDEX: 30.78 KG/M2 | WEIGHT: 215 LBS | HEIGHT: 70 IN | OXYGEN SATURATION: 99 % | TEMPERATURE: 98.6 F | RESPIRATION RATE: 16 BRPM | DIASTOLIC BLOOD PRESSURE: 87 MMHG

## 2024-05-06 PROCEDURE — 99284 EMERGENCY DEPT VISIT MOD MDM: CPT

## 2024-05-06 ASSESSMENT — PAIN - FUNCTIONAL ASSESSMENT: PAIN_FUNCTIONAL_ASSESSMENT: NONE - DENIES PAIN

## 2024-05-07 ENCOUNTER — APPOINTMENT (OUTPATIENT)
Dept: GENERAL RADIOLOGY | Age: 54
End: 2024-05-07
Payer: MEDICARE

## 2024-05-07 ENCOUNTER — HOSPITAL ENCOUNTER (EMERGENCY)
Age: 54
Discharge: HOME | End: 2024-05-07
Attending: EMERGENCY MEDICINE
Payer: MEDICARE

## 2024-05-07 DIAGNOSIS — S91.309A OPEN WOUND OF FOOT EXCLUDING TOES: Primary | ICD-10-CM

## 2024-05-07 LAB
ALBUMIN SERPL-MCNC: 3.7 G/DL (ref 3.5–5.2)
ALP SERPL-CCNC: 66 U/L (ref 40–129)
ALT SERPL-CCNC: 17 U/L (ref 0–40)
ANION GAP SERPL CALCULATED.3IONS-SCNC: 9 MMOL/L (ref 7–16)
AST SERPL-CCNC: 15 U/L (ref 0–39)
BASOPHILS # BLD: 0.05 K/UL (ref 0–0.2)
BASOPHILS NFR BLD: 1 % (ref 0–2)
BILIRUB SERPL-MCNC: 0.4 MG/DL (ref 0–1.2)
BUN SERPL-MCNC: 28 MG/DL (ref 6–20)
CALCIUM SERPL-MCNC: 9 MG/DL (ref 8.6–10.2)
CHLORIDE SERPL-SCNC: 99 MMOL/L (ref 98–107)
CO2 SERPL-SCNC: 25 MMOL/L (ref 22–29)
CREAT SERPL-MCNC: 1.4 MG/DL (ref 0.7–1.2)
EOSINOPHIL # BLD: 0.38 K/UL (ref 0.05–0.5)
EOSINOPHILS RELATIVE PERCENT: 6 % (ref 0–6)
ERYTHROCYTE [DISTWIDTH] IN BLOOD BY AUTOMATED COUNT: 13 % (ref 11.5–15)
GFR, ESTIMATED: 63 ML/MIN/1.73M2
GLUCOSE SERPL-MCNC: 332 MG/DL (ref 74–99)
HCT VFR BLD AUTO: 35.6 % (ref 37–54)
HGB BLD-MCNC: 12.4 G/DL (ref 12.5–16.5)
IMM GRANULOCYTES # BLD AUTO: <0.03 K/UL (ref 0–0.58)
IMM GRANULOCYTES NFR BLD: 0 % (ref 0–5)
LACTATE BLDV-SCNC: 0.9 MMOL/L (ref 0.5–2.2)
LYMPHOCYTES NFR BLD: 1.4 K/UL (ref 1.5–4)
LYMPHOCYTES RELATIVE PERCENT: 22 % (ref 20–42)
MCH RBC QN AUTO: 27.5 PG (ref 26–35)
MCHC RBC AUTO-ENTMCNC: 34.8 G/DL (ref 32–34.5)
MCV RBC AUTO: 78.9 FL (ref 80–99.9)
MONOCYTES NFR BLD: 0.63 K/UL (ref 0.1–0.95)
MONOCYTES NFR BLD: 10 % (ref 2–12)
NEUTROPHILS NFR BLD: 61 % (ref 43–80)
NEUTS SEG NFR BLD: 3.88 K/UL (ref 1.8–7.3)
PLATELET # BLD AUTO: 177 K/UL (ref 130–450)
PMV BLD AUTO: 10.6 FL (ref 7–12)
POTASSIUM SERPL-SCNC: 4 MMOL/L (ref 3.5–5)
PROT SERPL-MCNC: 6.2 G/DL (ref 6.4–8.3)
RBC # BLD AUTO: 4.51 M/UL (ref 3.8–5.8)
SODIUM SERPL-SCNC: 133 MMOL/L (ref 132–146)
WBC OTHER # BLD: 6.4 K/UL (ref 4.5–11.5)

## 2024-05-07 PROCEDURE — 80053 COMPREHEN METABOLIC PANEL: CPT

## 2024-05-07 PROCEDURE — 6370000000 HC RX 637 (ALT 250 FOR IP)

## 2024-05-07 PROCEDURE — 83605 ASSAY OF LACTIC ACID: CPT

## 2024-05-07 PROCEDURE — 73630 X-RAY EXAM OF FOOT: CPT

## 2024-05-07 PROCEDURE — 85025 COMPLETE CBC W/AUTO DIFF WBC: CPT

## 2024-05-07 PROCEDURE — 87040 BLOOD CULTURE FOR BACTERIA: CPT

## 2024-05-07 RX ORDER — CEPHALEXIN 500 MG/1
500 CAPSULE ORAL 4 TIMES DAILY
Qty: 28 CAPSULE | Refills: 0 | Status: SHIPPED | OUTPATIENT
Start: 2024-05-07 | End: 2024-05-14

## 2024-05-07 RX ORDER — DOXYCYCLINE HYCLATE 100 MG/1
100 CAPSULE ORAL ONCE
Status: COMPLETED | OUTPATIENT
Start: 2024-05-07 | End: 2024-05-07

## 2024-05-07 RX ORDER — CEPHALEXIN 500 MG/1
500 CAPSULE ORAL ONCE
Status: COMPLETED | OUTPATIENT
Start: 2024-05-07 | End: 2024-05-07

## 2024-05-07 RX ORDER — DOXYCYCLINE HYCLATE 100 MG
100 TABLET ORAL 2 TIMES DAILY
Qty: 14 TABLET | Refills: 0 | Status: SHIPPED | OUTPATIENT
Start: 2024-05-07 | End: 2024-05-14

## 2024-05-07 RX ADMIN — DOXYCYCLINE HYCLATE 100 MG: 100 CAPSULE ORAL at 02:33

## 2024-05-07 RX ADMIN — CEPHALEXIN 500 MG: 500 CAPSULE ORAL at 02:33

## 2024-05-07 NOTE — ED PROVIDER NOTES
Feet:    Feet:      Left foot:      Skin integrity: Skin breakdown present.   Skin:     General: Skin is warm and dry.      Capillary Refill: Capillary refill takes less than 2 seconds.      Findings: Lesion present.   Neurological:      General: No focal deficit present.      Mental Status: He is alert and oriented to person, place, and time.   Psychiatric:         Mood and Affect: Mood normal.          Procedures     Medical Decision Making  53-year-old male presents the ER complaining of wound on his left foot.  DDx: Osteomyelitis, nonhealing foot ulcer, other.  Patient's overall laboratory evaluation was reassuring and unremarkable.  White blood cell count 6.4.  X-ray of the left foot shows a first transmetatarsal amputation, no radiographic evidence of osteomyelitis.  Patient's further laboratory evaluation unremarkable and reassuring.  Patient given dose of Keflex and doxycycline in the ER.  He was prescribed Keflex and Doxy to his pharmacy and then he will follow-up with his podiatrist and his PCP.  Patient is medically cleared for discharge home.  Is overall well-appearing.  He is afebrile, normal heart rate, normotensive.  All of his questions were answered along with his wife at bedside.  Patient stable at time of discharge home.    Amount and/or Complexity of Data Reviewed  Labs: ordered. Decision-making details documented in ED Course.  Radiology: ordered.    Risk  Prescription drug management.         ED Course as of 05/07/24 0226   Tue May 07, 2024   0201 WBC: 6.4 [JR]      ED Course User Index  [JR] Bang Gunter DO       ED Course as of 05/07/24 0226   Tue May 07, 2024   0201 WBC: 6.4 [JR]      ED Course User Index  [JR] Bang Gunter DO       --------------------------------------------- PAST HISTORY ---------------------------------------------  Past Medical History:  has a past medical history of Blind in both eyes, Diabetes mellitus (HCC), Hypertension, and Retinal detachment.    Past

## 2024-05-12 LAB
MICROORGANISM SPEC CULT: NORMAL
SERVICE CMNT-IMP: NORMAL
SPECIMEN DESCRIPTION: NORMAL

## (undated) DEVICE — TAPE ADH W1INXL10YD WHT PAPR GENTLE BRTH FLX COMFORTABLE

## (undated) DEVICE — BIOM OPTIC SET DISPOSABLE, WITH BIOM HD FLEX LENS FOR F=175 MM OR F=200 MM: Brand: BIOM OPTIC SET DISPOSABLE, WITH BIOM HD FLEX LENS FOR F=175 MM OR F=200 MM

## (undated) DEVICE — HEMOSTAT, ARISTA, ABSORBABLE, 3 GRAMS

## (undated) DEVICE — SUTURE, VICRYL, 2-0, 27 IN, UR-6, VIOLET

## (undated) DEVICE — TIP, SUCTION, YANKAUER, BULB, ADULT

## (undated) DEVICE — Z DUP USE 2522782 SOLUTION IRRIG 1000ML STRL H2O PLAS CONTAINER UROMATIC

## (undated) DEVICE — SUPPORTER, ATHLETIC, ADULT, X-LARGE

## (undated) DEVICE — COUNTER, NEEDLE, FOAM BLOCK, POP-N-COUNT, W/BLADEGUARD, W/ADHESIVE 40 COUNT, RED

## (undated) DEVICE — GOWN,SIRUS,FABRNF,XL,20/CS: Brand: MEDLINE

## (undated) DEVICE — CAUTERY BPLR 25GA INTOCU W/ HNDPC CRD DISP FOR CX9404

## (undated) DEVICE — VISCOUS FLUID CONTROL PAK: Brand: CONSTELLATION

## (undated) DEVICE — CANNULA INJ 25GA SFT TIP DISP

## (undated) DEVICE — Device: Brand: 25-27G BRITELIGHT™ ANGLED 20° ENDOPROBE® BOX OF 6

## (undated) DEVICE — DRAIN, WOUND, FLAT, HUBLESS, FULL LENGTH PERFORATION, 10 MM X 20 CM, SILICONE

## (undated) DEVICE — DECANTER: Brand: UNBRANDED

## (undated) DEVICE — DRESSING, NON-ADHERENT, TELFA, OUCHLESS, 3 X 8 IN, STERILE

## (undated) DEVICE — SUTURE, MONOCRYL, 3-0, 18 IN, PS2, UNDYED

## (undated) DEVICE — 3M™ TEGADERM™ TRANSPARENT FILM DRESSING FRAME STYLE, 1626W, 4 IN X 4-3/4 IN (10 CM X 12 CM), 50/CT 4CT/CASE: Brand: 3M™ TEGADERM™

## (undated) DEVICE — GRIESHABER™ 45° PIC DSP 25G: Brand: ALCON GRIESHABER

## (undated) DEVICE — PAD,EYE,1-5/8X2 5/8,STERILE,LF,1/PK: Brand: MEDLINE

## (undated) DEVICE — ASSEMBLY KIT, TITAN STANDARD

## (undated) DEVICE — SUTURE, ETHILON, 2-0, 18 IN, FS, BLACK, BX/12

## (undated) DEVICE — SYRINGE, 50 CC, LUER LOCK

## (undated) DEVICE — DRESSING, ADHESIVE, ISLAND, TELFA, 2 X 3.75 IN, LF

## (undated) DEVICE — DRESSING, TRANSPARENT, TEGADERM, 4 X 4-3/4 IN

## (undated) DEVICE — SYRINGE, 20 CC, LUER LOCK, MONOJECT, W/O CAP, LF

## (undated) DEVICE — SYRINGE, 60 CC, IRRIGATION, BULB, CONTRO-BULB, PAPER POUCH

## (undated) DEVICE — SET EXTN SUPERFLEX SFT FLX TBNG W M FEM LUER CONN

## (undated) DEVICE — SYRINGE, LUER LOCK, 12ML

## (undated) DEVICE — BRACELET ID ALERT FOR PT INFO ISPAN

## (undated) DEVICE — SYRINGE TB 1ML NDL 25GA L0.625IN PLAS SLIP TIP CONVENTIONAL

## (undated) DEVICE — SURGICAL PROCEDURE PACK 25 GA REG TOT +

## (undated) DEVICE — SYRINGE, LUER LOCK, 10ML: Brand: MEDLINE

## (undated) DEVICE — DUAL BORE CANNULA 20G: Brand: MEDONE

## (undated) DEVICE — EVACUATOR, WOUND, SUCTION, CLOSED, JACKSON-PRATT, 100 CC, SILICONE

## (undated) DEVICE — TOWEL PACK, STERILE, 4/PACK, BLUE

## (undated) DEVICE — DRAPE, TOWEL, STERI DRAPE, 17 X 11 IN, PLASTIC, STERILE

## (undated) DEVICE — BAG, DRAINAGE, URINARY, URINE METER, INFECTION CONTROL, LF, 350ML

## (undated) DEVICE — GLOVE, SURGICAL, PROTEXIS PI BLUE W/NEUTHERA, 7.5, PF, LF

## (undated) DEVICE — NEEDLE HYPO 25GA L1.5IN BLU POLYPR HUB S STL REG BVL STR

## (undated) DEVICE — 25+® REVOLUTION DSP ILM FORCEPS: Brand: ALCON GRIESHABER REVOLUTION 25+

## (undated) DEVICE — SOLUTION IRRIG BSS ST 500ML

## (undated) DEVICE — SET SCLERAL BUCKLE INSTR

## (undated) DEVICE — DRAPE MICSCP FULL FLD STRL OCULUS ZEISS

## (undated) DEVICE — GLOVE SURG SZ 8 CRM LTX FREE POLYISOPRENE POLYMER BEAD ANTI

## (undated) DEVICE — SHIELD EYE W3XL2.5IN UNIV CLR PLAS LTWT

## (undated) DEVICE — DRESSING, GAUZE, FLUFF, 1 PLY, 18 X 36 IN

## (undated) DEVICE — GOWN, SURGICAL, SMARTGOWN, XLARGE, STERILE

## (undated) DEVICE — PRONE PILLOW: Brand: DEROYAL

## (undated) DEVICE — STANDARD HYPODERMIC NEEDLE,POLYPROPYLENE HUB: Brand: MONOJECT

## (undated) DEVICE — SOLUTION, PREP, PVP IODINE, FLIP TOP, 4OZ

## (undated) DEVICE — KIT, MINOR, DOUBLE BASIN

## (undated) DEVICE — CANNULA IRRIGATION DOME 45 DEG 27 GAX7/8 IN SMOOTH RYCROFT

## (undated) DEVICE — OPHTHALMIC CORNEAL/SCLERAL V-LANCE KNIFE 20 GAUGE [1.3MM]: Brand: V-LANCE

## (undated) DEVICE — PACK PROCEDURE SURG RETINAL ST ES CUST

## (undated) DEVICE — LENS MICSCP ENH W FLD BIOM

## (undated) DEVICE — 25+® GRIESHABER MAXGRIP® REV DSP FORCEPS: Brand: ALCON GRIESHABER FINESSE 25+ MAXGRIP REVOLUTION

## (undated) DEVICE — STATLOCK, FOLEY SWIVEL, SILICONE TRICOT

## (undated) DEVICE — TAPE ADH W2INXL10YD PLAS TRNSPAR H2O RESIST HYPOALRG CURAD

## (undated) DEVICE — TAPE ADH W1INXL10YD PLAS TRNSPAR H2O RESIST HYPOALRG CURAD

## (undated) DEVICE — CANNULA OPHTH 2 BOR 25 GA 0.5 MM SS

## (undated) DEVICE — GLOVE SURG SZ 7 CRM LTX FREE POLYISOPRENE POLYMER BEAD ANTI

## (undated) DEVICE — LUBRICANT, SURGILUBE, STERILE, 2OZ

## (undated) DEVICE — SYSTEM, PENILE IMPLANT, W/6 BLUNT TIP, 12MM STAY HOOKS

## (undated) DEVICE — BANDAGE, GAUZE, CONFORMING, KERLIX, 6 PLY, 4.5 IN X 4.1 YD

## (undated) DEVICE — SYRINGE, 50 CC, IRRIGATION, CATHETER TIP, DISPOSABLE, STERILE, LF

## (undated) DEVICE — DRAPE, ISOLATION, BAG, DRAW STRING CLOSURE, STERI DRAPE, LARGE, 20 X 20 IN, DISPOSABLE, STERILE

## (undated) DEVICE — SYRINGE, LUER LOCK, 5ML: Brand: MEDLINE

## (undated) DEVICE — NEEDLE FLTR 18GA L1.5IN MEM THK5UM BLNT DISP

## (undated) DEVICE — SET RETINA